# Patient Record
Sex: FEMALE | Race: WHITE | NOT HISPANIC OR LATINO | Employment: FULL TIME | URBAN - METROPOLITAN AREA
[De-identification: names, ages, dates, MRNs, and addresses within clinical notes are randomized per-mention and may not be internally consistent; named-entity substitution may affect disease eponyms.]

---

## 2023-08-28 PROBLEM — N95.0 POSTMENOPAUSAL BLEEDING: Status: ACTIVE | Noted: 2023-08-28

## 2023-08-28 PROBLEM — M79.641 PAIN IN BOTH HANDS: Status: ACTIVE | Noted: 2023-08-28

## 2023-08-28 PROBLEM — H01.006 BLEPHARITIS OF BOTH EYES: Status: ACTIVE | Noted: 2023-08-28

## 2023-08-28 PROBLEM — H52.13 MYOPIA OF BOTH EYES: Status: ACTIVE | Noted: 2023-08-28

## 2023-08-28 PROBLEM — R92.8 ABNORMAL MAMMOGRAM: Status: ACTIVE | Noted: 2023-08-28

## 2023-08-28 PROBLEM — M06.9 RHEUMATOID ARTHRITIS (MULTI): Status: ACTIVE | Noted: 2023-08-28

## 2023-08-28 PROBLEM — B37.9 YEAST INFECTION: Status: ACTIVE | Noted: 2023-08-28

## 2023-08-28 PROBLEM — M79.642 PAIN IN BOTH HANDS: Status: ACTIVE | Noted: 2023-08-28

## 2023-08-28 PROBLEM — R55 SYNCOPE: Status: ACTIVE | Noted: 2023-08-28

## 2023-08-28 PROBLEM — R07.9 CHEST PAIN: Status: ACTIVE | Noted: 2023-08-28

## 2023-08-28 PROBLEM — H01.003 BLEPHARITIS OF BOTH EYES: Status: ACTIVE | Noted: 2023-08-28

## 2023-08-28 PROBLEM — E78.5 DYSLIPIDEMIA (HIGH LDL; LOW HDL): Status: ACTIVE | Noted: 2023-08-28

## 2023-08-28 PROBLEM — K52.9 INFLAMMATORY BOWEL DISEASE: Status: ACTIVE | Noted: 2023-08-28

## 2023-08-28 PROBLEM — R00.2 PALPITATIONS: Status: ACTIVE | Noted: 2023-08-28

## 2023-08-28 PROBLEM — K90.0 CELIAC DISEASE (HHS-HCC): Status: ACTIVE | Noted: 2023-08-28

## 2023-08-28 RX ORDER — TOFACITINIB 11 MG/1
11 TABLET, FILM COATED, EXTENDED RELEASE ORAL DAILY
COMMUNITY
Start: 2019-10-04 | End: 2023-10-05 | Stop reason: ALTCHOICE

## 2023-08-28 RX ORDER — CELECOXIB 200 MG/1
200 CAPSULE ORAL 2 TIMES DAILY
COMMUNITY
Start: 2023-08-03 | End: 2023-11-01 | Stop reason: ALTCHOICE

## 2023-08-28 RX ORDER — TOCILIZUMAB 180 MG/ML
0.9 INJECTION, SOLUTION SUBCUTANEOUS
COMMUNITY
Start: 2023-08-03 | End: 2023-10-05 | Stop reason: SDUPTHER

## 2023-10-05 ENCOUNTER — LAB (OUTPATIENT)
Dept: LAB | Facility: LAB | Age: 53
End: 2023-10-05
Payer: COMMERCIAL

## 2023-10-05 ENCOUNTER — OFFICE VISIT (OUTPATIENT)
Dept: RHEUMATOLOGY | Facility: CLINIC | Age: 53
End: 2023-10-05
Payer: COMMERCIAL

## 2023-10-05 VITALS
BODY MASS INDEX: 22.18 KG/M2 | WEIGHT: 138 LBS | SYSTOLIC BLOOD PRESSURE: 106 MMHG | DIASTOLIC BLOOD PRESSURE: 66 MMHG | HEIGHT: 66 IN

## 2023-10-05 DIAGNOSIS — M06.9 RHEUMATOID ARTHRITIS, INVOLVING UNSPECIFIED SITE, UNSPECIFIED WHETHER RHEUMATOID FACTOR PRESENT (MULTI): Primary | ICD-10-CM

## 2023-10-05 DIAGNOSIS — M06.9 RHEUMATOID ARTHRITIS, INVOLVING UNSPECIFIED SITE, UNSPECIFIED WHETHER RHEUMATOID FACTOR PRESENT (MULTI): ICD-10-CM

## 2023-10-05 DIAGNOSIS — Z79.899 ENCOUNTER FOR LONG-TERM (CURRENT) USE OF MEDICATIONS: ICD-10-CM

## 2023-10-05 LAB
ALBUMIN SERPL BCP-MCNC: 4.6 G/DL (ref 3.4–5)
ALP SERPL-CCNC: 55 U/L (ref 33–110)
ALT SERPL W P-5'-P-CCNC: 22 U/L (ref 7–45)
ANION GAP SERPL CALC-SCNC: 16 MMOL/L (ref 10–20)
AST SERPL W P-5'-P-CCNC: 27 U/L (ref 9–39)
BILIRUB SERPL-MCNC: 0.7 MG/DL (ref 0–1.2)
BUN SERPL-MCNC: 15 MG/DL (ref 6–23)
CALCIUM SERPL-MCNC: 9 MG/DL (ref 8.6–10.3)
CHLORIDE SERPL-SCNC: 101 MMOL/L (ref 98–107)
CO2 SERPL-SCNC: 29 MMOL/L (ref 21–32)
CREAT SERPL-MCNC: 0.68 MG/DL (ref 0.5–1.05)
CRP SERPL-MCNC: 0.13 MG/DL
ERYTHROCYTE [DISTWIDTH] IN BLOOD BY AUTOMATED COUNT: 12.4 % (ref 11.5–14.5)
ERYTHROCYTE [SEDIMENTATION RATE] IN BLOOD BY WESTERGREN METHOD: <1 MM/H (ref 0–30)
GFR SERPL CREATININE-BSD FRML MDRD: >90 ML/MIN/1.73M*2
GLUCOSE SERPL-MCNC: 69 MG/DL (ref 74–99)
HCT VFR BLD AUTO: 42.8 % (ref 36–46)
HGB BLD-MCNC: 14.2 G/DL (ref 12–16)
MCH RBC QN AUTO: 32 PG (ref 26–34)
MCHC RBC AUTO-ENTMCNC: 33.2 G/DL (ref 32–36)
MCV RBC AUTO: 96 FL (ref 80–100)
NRBC BLD-RTO: 0 /100 WBCS (ref 0–0)
PLATELET # BLD AUTO: 258 X10*3/UL (ref 150–450)
PMV BLD AUTO: 9.1 FL (ref 7.5–11.5)
POTASSIUM SERPL-SCNC: 4.6 MMOL/L (ref 3.5–5.3)
PROT SERPL-MCNC: 6.8 G/DL (ref 6.4–8.2)
RBC # BLD AUTO: 4.44 X10*6/UL (ref 4–5.2)
SODIUM SERPL-SCNC: 141 MMOL/L (ref 136–145)
WBC # BLD AUTO: 5.1 X10*3/UL (ref 4.4–11.3)

## 2023-10-05 PROCEDURE — 85027 COMPLETE CBC AUTOMATED: CPT

## 2023-10-05 PROCEDURE — 86140 C-REACTIVE PROTEIN: CPT

## 2023-10-05 PROCEDURE — 99213 OFFICE O/P EST LOW 20 MIN: CPT | Performed by: INTERNAL MEDICINE

## 2023-10-05 PROCEDURE — 1036F TOBACCO NON-USER: CPT | Performed by: INTERNAL MEDICINE

## 2023-10-05 PROCEDURE — 85652 RBC SED RATE AUTOMATED: CPT

## 2023-10-05 PROCEDURE — 36415 COLL VENOUS BLD VENIPUNCTURE: CPT

## 2023-10-05 PROCEDURE — 80053 COMPREHEN METABOLIC PANEL: CPT

## 2023-10-05 ASSESSMENT — ENCOUNTER SYMPTOMS
DIZZINESS: 0
DYSURIA: 0
HEADACHES: 0
ABDOMINAL PAIN: 0
SHORTNESS OF BREATH: 0
FATIGUE: 0
ADENOPATHY: 0
SLEEP DISTURBANCE: 0

## 2023-10-05 NOTE — PROGRESS NOTES
Subjective   Patient ID: Sarika Figueroa is a 53 y.o. female who presents for No chief complaint on file..  HPI  Patient with rheumatoid arthritis and rheumatoid nodules specifically in her hands.  Previous medications have included methotrexate, leflunomide, Humira, Enbrel, Xeljanz, Orencia.    At her last visit we started her on Actemra.  She has been doing it every other week.  She does feel that the size of her nodules have decreased though there is 1 slightly enlarged in the index finger of her right hand and her pain has significantly improved.    Denies any side effects to the medication.    Has not done her blood work.    Denies any thrush or yeast infections.  No stomach issues.  No rashes.  Review of Systems   Constitutional:  Negative for fatigue.   HENT:  Negative for mouth sores.    Eyes:  Negative for visual disturbance.   Respiratory:  Negative for shortness of breath.    Cardiovascular:  Negative for chest pain.   Gastrointestinal:  Negative for abdominal pain.   Genitourinary:  Negative for dysuria.   Musculoskeletal:         Per HPI   Skin:  Negative for rash.   Neurological:  Negative for dizziness and headaches.   Hematological:  Negative for adenopathy.   Psychiatric/Behavioral:  Negative for sleep disturbance.        Objective   Physical Exam  GEN: NAD A&O x3 appropriate affect  SKIN: no rashes seen on exposed skin  MSK:  Rheumatoid nodule on medial aspect of index finger and also on left hand and third lateral DIP.  No swelling in the MCPs or PIPs no tenderness no swelling in the wrists elbows shoulders knees ankles  Assessment/Plan        Rheumatoid arthritis -previously has been on methotrexate, Humira, Enbrel, Xeljanz, Orencia.  Currently on Actemra and she does have improvement in her pain symptoms.  Does not have as many swollen tender joints as she did previously and also has not had any new rheumatoid nodules appear.  She still has not done blood work, we will put in for standing  order into the system.    We can see her again in 4 to 6 months or as needed.

## 2023-10-26 ENCOUNTER — PHARMACY VISIT (OUTPATIENT)
Dept: PHARMACY | Facility: CLINIC | Age: 53
End: 2023-10-26
Payer: COMMERCIAL

## 2023-10-26 ENCOUNTER — SPECIALTY PHARMACY (OUTPATIENT)
Dept: PHARMACY | Facility: CLINIC | Age: 53
End: 2023-10-26

## 2023-10-26 PROCEDURE — RXMED WILLOW AMBULATORY MEDICATION CHARGE

## 2023-10-27 ENCOUNTER — APPOINTMENT (OUTPATIENT)
Dept: RHEUMATOLOGY | Facility: CLINIC | Age: 53
End: 2023-10-27
Payer: COMMERCIAL

## 2023-10-30 ENCOUNTER — OFFICE VISIT (OUTPATIENT)
Dept: PRIMARY CARE | Facility: CLINIC | Age: 53
End: 2023-10-30
Payer: COMMERCIAL

## 2023-10-30 DIAGNOSIS — R10.13 EPIGASTRIC PAIN: Primary | ICD-10-CM

## 2023-10-30 PROCEDURE — 99213 OFFICE O/P EST LOW 20 MIN: CPT | Performed by: INTERNAL MEDICINE

## 2023-10-30 PROCEDURE — 1036F TOBACCO NON-USER: CPT | Performed by: INTERNAL MEDICINE

## 2023-10-30 NOTE — PROGRESS NOTES
Subjective   Patient ID: Sarika Figueroa is a 53 y.o. female who presents for No chief complaint on file..    HPI  Please see other encounter , she had been marked as no show but she was actually here waiting .       She is actually here and there was a glitch with signing her in   Abdominal pain on October 15  upper stomach cramping not nauseous dull 6/10 get s better at the end of the day tried to take out caffeine   Does n ot take vitamins , was waking up with with it   No difference with other foods  no apple cider , during the event had probiotic bars and the powder vegan gluten free but she stopped it   Some burping not different , no constipation not looser   She started actemra, missed her last dose will administer it this Thursday      Review of Systems  General: Denies fever, chills, night sweats,  Eyes: Negative for recent visual changes  Ears, Nose, Throat :  Negative for hearing changes, sinus discomfort  Dermatologic: Negative for new skin conditions, rash  Respiratory: Negative for wheezing, shortness of breath, cough  Cardiovascular: Negative for chest pain, palpitations, or leg swelling  Gastrointestinal: Negative for nausea/vomiting,  changes in bowel habits  Genitourinary NEGATIVE FOR URINARY INCONTINENCE urgency , frequency, discomfort   Musculoskeletal: see hpi  Neurological: Negative for headaches, dizziness    Previous history  Past Medical History:   Diagnosis Date    Chest pain, unspecified 03/21/2018    Chest pain    Encounter for other screening for malignant neoplasm of breast 10/23/2020    Breast screening    Encounter for screening for malignant neoplasm of colon 01/12/2021    Colon cancer screening    Excessive and frequent menstruation with regular cycle     Menorrhagia with regular cycle    Hyperlipidemia, unspecified 10/28/2021    Dyslipidemia (high LDL; low HDL)    Palpitations 03/21/2018    Palpitations    Personal history of other infectious and parasitic diseases 06/14/2016     History of Helicobacter infection     Past Surgical History:   Procedure Laterality Date    BACK SURGERY  10/14/2016    Back Surgery    COLONOSCOPY  06/14/2016    Complete Colonoscopy    HYSTEROSCOPY  02/15/2018    Hysteroscopy With Endometrial Ablation    OTHER SURGICAL HISTORY  03/02/2022    Dilation and curettage    SHOULDER SURGERY  10/14/2016    Shoulder Surgery     Social History     Tobacco Use    Smoking status: Never     Passive exposure: Never    Smokeless tobacco: Never   Substance Use Topics    Alcohol use: Yes     Alcohol/week: 2.0 standard drinks of alcohol     Types: 2 Glasses of wine per week    Drug use: Never     Family History   Problem Relation Name Age of Onset    Migraines Mother      Colonic polyp Father      Other (abdominal aneurysm) Brother      Ulcerative colitis Paternal Grandfather      Colonic polyp Paternal Grandfather      Ulcerative colitis Other sibling      Allergies   Allergen Reactions    Humira [Adalimumab] Rash     Current Outpatient Medications   Medication Instructions    celecoxib (CELEBREX) 200 mg, oral, 2 times daily, Take with food     omeprazole (PRILOSEC) 40 mg, oral, Daily before breakfast    tocilizumab (Actemra ACTPen) 162 mg/0.9 mL INJECT 1 PEN (162MG) UNDER THE SKIN EVERY OTHER WEEK.       Objective       Physical Exam  Vital Signs: as recorded above  General: Well groomed, well nourished   Orientation:  Alert , oriented to time, place , and person   Mood and Affect:  Cooperative , no apparent distress normal affect  Skin: Good color, good turgor  Eyes: Extra ocular muscle movements intact, anicteric sclerae  Neck: Supple, full range of movement  Chest: Normal breath sounds, normal chest wall exam, symmetric, good air entry, clear to auscultation  Heart: Regular rate and rhythm, without murmur, gallop, or rubs  Abdomen soft mild-mod epigastric tenderness  no masses felt no hepatosplenomegaly, no rebound or guarding  BACK:  no CTLS spine tenderness, no flank  tenderness  Extremities: full range of movement  bilateral UE and bilateral LE,  no lower extremity edema  Neurological: Alert, oriented, cranial nerves II-XII intact except for visual acuity  Sensation:  Intact   Gait: normal steady      Assessment/Plan   Sarika Figueroa is a 53 y.o. female who presents for the concerns below:    Problem List Items Addressed This Visit    None  ABDOMINAL PAIN/REFLUX PLAN:  bland diet no spicy, oily, dairy, acidic i.e. orange juice, coffee, until pain is better  hold off on use of nsaids  if pain is worse will consider imaging studies  start with PPI  hold off on certain medications i.e. supplements  if not better will consult Gastroenterology if severe or associated with fever, vomiting, bloody stool proceed to the emergency dept    Mammogram order in and gyne referral        Discussed with:   Return in : cpe in early December     Portions of this note were generated using digital voice recognition software, and may contain grammatical errors       Ami Ramirez MD  10/30/23  10:11 AM

## 2023-11-01 ENCOUNTER — CONSULT (OUTPATIENT)
Dept: ALLERGY | Facility: CLINIC | Age: 53
End: 2023-11-01
Payer: COMMERCIAL

## 2023-11-01 VITALS — BODY MASS INDEX: 21.95 KG/M2 | OXYGEN SATURATION: 99 % | HEART RATE: 70 BPM | WEIGHT: 136 LBS

## 2023-11-01 DIAGNOSIS — J30.81 ALLERGIC RHINITIS DUE TO ANIMAL HAIR AND DANDER: Primary | ICD-10-CM

## 2023-11-01 PROBLEM — R07.89 CHEST TIGHTNESS: Status: ACTIVE | Noted: 2023-11-01

## 2023-11-01 PROCEDURE — 99204 OFFICE O/P NEW MOD 45 MIN: CPT | Performed by: ALLERGY & IMMUNOLOGY

## 2023-11-01 PROCEDURE — 95004 PERQ TESTS W/ALRGNC XTRCS: CPT | Performed by: ALLERGY & IMMUNOLOGY

## 2023-11-01 PROCEDURE — 95018 ALL TSTG PERQ&IQ DRUGS/BIOL: CPT | Performed by: ALLERGY & IMMUNOLOGY

## 2023-11-01 RX ORDER — TRIAMCINOLONE ACETONIDE 55 UG/1
2 SPRAY, METERED NASAL DAILY
Qty: 16.9 ML | Refills: 0
Start: 2023-11-01 | End: 2024-01-30

## 2023-11-01 RX ORDER — MONTELUKAST SODIUM 10 MG/1
10 TABLET ORAL DAILY PRN
Qty: 30 TABLET | Refills: 5 | Status: SHIPPED | OUTPATIENT
Start: 2023-11-01 | End: 2024-10-31

## 2023-11-01 RX ORDER — ALBUTEROL SULFATE 90 UG/1
1-2 AEROSOL, METERED RESPIRATORY (INHALATION) EVERY 4 HOURS PRN
Qty: 8.5 G | Refills: 11 | Status: SHIPPED | OUTPATIENT
Start: 2023-11-01 | End: 2024-10-31

## 2023-11-01 ASSESSMENT — ENCOUNTER SYMPTOMS
SHORTNESS OF BREATH: 0
COUGH: 0
RHINORRHEA: 1
PALPITATIONS: 0
SORE THROAT: 0
CONSTITUTIONAL NEGATIVE: 1
CONFUSION: 0
CHEST TIGHTNESS: 1
SINUS PRESSURE: 1
STRIDOR: 0
SINUS PAIN: 0
SLEEP DISTURBANCE: 0
AGITATION: 0
DYSPHORIC MOOD: 0
WHEEZING: 0

## 2023-11-01 NOTE — PATIENT INSTRUCTIONS
Skin testing is positive to dog, dust mite, cat, Alternaria mold, grass, tree and weeds.    Stop Benadryl.    Take Zyrtec and Singulair (montelukast) prior to exposures to help with sinus and lung inflammation.  Side effects reported include vivid dreams, mood changes as well as ear popping.  If you experience ear popping, this indicates the medication is working so please continue to take it.    Start allergy immunotherapy.  We will prepare vial mix and call you to schedule your first shot.  (CPT codes for mix vials 00909.  Allergy injections  07843 or 31568).       Start over the counter Nasacort AQ 2 sprays each side one time a day.  To increase the efficacy of your nasal spray, be sure to look down while using it.?  Spray slightly away from the direction of your nasal septum (the bone in the middle of your nose) and only sniff after you have sprayed - avoid spraying and sniffing at the same time, or else a lot of spray will go down your throat.    Be sure to wash your bedding, including your sheets, weekly in hot water, in order to reduce dust mites.     Encase your pillow and mattress in a hypoallergenic or anti-dust mite case.    Invest in a HEPA air filter if you do not have one, and if possible, put it in the patient's bedroom.    If you have pets, avoid having them in the bedroom.      Albuterol as needed with wheezing.

## 2023-11-01 NOTE — ASSESSMENT & PLAN NOTE
Increased symptoms with dog exposure. Her daughter will be having a baby soon and she is unable to tolerate the dog at her daughter's home. She will start immunotherapy and take zyrtec and montelukast prior to exposure.

## 2023-11-01 NOTE — PROGRESS NOTES
"    Subjective   Patient ID:   [id number]   Sarika Figueroa is a 53 y.o. female who presents for Allergy Testing (NPV, congestion, sneezing and coughing ).    Chief Complaint   Patient presents with    Allergy Testing     NPV, congestion, sneezing and coughing      Patient reports a H/O dog and cat allergy.  Her daughter, who is pregnant, has a [place] she reacts to.  She has to take Benadryl prior to going there and will still experience chest tightness.  Her other daughter has a cat she keeps in her room and she would like to be able to have contact with it.      Patient has a H/O seasonal allergy symptoms that always start around 08/15/2023 as well as spring.  She will have swelling in the \"whites\" of her eyes and will use Opcon A eyedrops and Claritin or Zyrtec,with minimal relief.  She has used Flonase intermittently.  Patient notes she is always congested, but the chest tightness occurs only when she is around pet dander.  She has never had formal allergy testing.    Patient requests allergy testing to determine allergen status.        Review of Systems   Constitutional: Negative.    HENT:  Positive for rhinorrhea, sinus pressure and sneezing. Negative for ear discharge, ear pain, sinus pain and sore throat.    Respiratory:  Positive for chest tightness. Negative for cough, shortness of breath, wheezing and stridor.    Cardiovascular:  Negative for chest pain, palpitations and leg swelling.   Skin:  Negative for rash.   Allergic/Immunologic: Positive for environmental allergies. Negative for food allergies and immunocompromised state.   Psychiatric/Behavioral:  Negative for agitation, confusion, dysphoric mood and sleep disturbance.        Objective     Pulse 70   Wt 61.7 kg (136 lb)   SpO2 99%   BMI 21.95 kg/m²      Physical Exam  Constitutional:       Appearance: Normal appearance.   HENT:      Head: Normocephalic and atraumatic.      Right Ear: External ear normal. There is no impacted cerumen.    "   Left Ear: External ear normal. There is no impacted cerumen.      Nose: Congestion present. No rhinorrhea.      Comments: bilateral nasal turbinate edema   Eyes:      Extraocular Movements: Extraocular movements intact.      Conjunctiva/sclera: Conjunctivae normal.      Pupils: Pupils are equal, round, and reactive to light.   Cardiovascular:      Rate and Rhythm: Normal rate and regular rhythm.      Heart sounds: No murmur heard.     No friction rub. No gallop.   Pulmonary:      Effort: No respiratory distress.      Breath sounds: No wheezing, rhonchi or rales.   Skin:     General: Skin is warm and dry.   Neurological:      Mental Status: She is alert.   Psychiatric:         Mood and Affect: Mood normal.         Behavior: Behavior normal.            Current Outpatient Medications   Medication Sig Dispense Refill    omeprazole (PriLOSEC) 40 mg DR capsule Take 1 capsule (40 mg) by mouth once daily in the morning. Take before meals. 30 capsule 2    tocilizumab (Actemra ACTPen) 162 mg/0.9 mL INJECT 1 PEN (162MG) UNDER THE SKIN EVERY OTHER WEEK. 1.8 mL 4    albuterol (ProAir HFA) 90 mcg/actuation inhaler Inhale 1-2 puffs every 4 hours if needed for wheezing. 8.5 g 11    montelukast (Singulair) 10 mg tablet Take 1 tablet (10 mg) by mouth once daily as needed (prior to exposure). 30 tablet 5    triamcinolone (Nasacort) 55 mcg nasal inhaler Administer 2 sprays into each nostril once daily. 16.9 mL 0     No current facility-administered medications for this visit.         Orders Placed This Encounter   Procedures    Allergy skin tests allergens, each          Assessment/Plan         Allergic rhinitis due to animal hair and dander  Increased symptoms with dog exposure. Her daughter will be having a baby soon and she is unable to tolerate the dog at her daughter's home. She will start immunotherapy and take zyrtec and montelukast prior to exposure.     Chest tightness  Albuterol as needed. Singulair prior to exposure to  dog      By signing my name below, I, Darcy Mckeon, attest that this documentation has been prepared under the direction and in the presence of Elyse Singer MD.   All medical record entries made by the [facility] were at my direction and personally dictated by me. I have reviewed the chart and agree that the record accurately reflects my personal performance of the history, physical exam, discussion and plan.

## 2023-11-14 DIAGNOSIS — J30.9 ALLERGIC RHINITIS, UNSPECIFIED SEASONALITY, UNSPECIFIED TRIGGER: Primary | ICD-10-CM

## 2023-11-14 PROCEDURE — 95165 ANTIGEN THERAPY SERVICES: CPT | Performed by: ALLERGY & IMMUNOLOGY

## 2023-11-24 ENCOUNTER — PHARMACY VISIT (OUTPATIENT)
Dept: PHARMACY | Facility: CLINIC | Age: 53
End: 2023-11-24
Payer: COMMERCIAL

## 2023-11-24 ENCOUNTER — SPECIALTY PHARMACY (OUTPATIENT)
Dept: PHARMACY | Facility: CLINIC | Age: 53
End: 2023-11-24

## 2023-11-24 PROCEDURE — RXMED WILLOW AMBULATORY MEDICATION CHARGE

## 2023-11-28 ENCOUNTER — CLINICAL SUPPORT (OUTPATIENT)
Dept: ALLERGY | Facility: CLINIC | Age: 53
End: 2023-11-28
Payer: COMMERCIAL

## 2023-11-28 DIAGNOSIS — J30.9 ALLERGIC RHINITIS, UNSPECIFIED SEASONALITY, UNSPECIFIED TRIGGER: ICD-10-CM

## 2023-11-28 PROCEDURE — 95117 IMMUNOTHERAPY INJECTIONS: CPT | Performed by: ALLERGY & IMMUNOLOGY

## 2023-11-30 ENCOUNTER — SPECIALTY PHARMACY (OUTPATIENT)
Dept: PHARMACY | Facility: CLINIC | Age: 53
End: 2023-11-30

## 2023-12-05 ENCOUNTER — CLINICAL SUPPORT (OUTPATIENT)
Dept: ALLERGY | Facility: CLINIC | Age: 53
End: 2023-12-05
Payer: COMMERCIAL

## 2023-12-05 DIAGNOSIS — J30.9 ALLERGIC RHINITIS, UNSPECIFIED SEASONALITY, UNSPECIFIED TRIGGER: ICD-10-CM

## 2023-12-05 PROCEDURE — 95117 IMMUNOTHERAPY INJECTIONS: CPT | Performed by: ALLERGY & IMMUNOLOGY

## 2023-12-07 ENCOUNTER — ANCILLARY PROCEDURE (OUTPATIENT)
Dept: RADIOLOGY | Facility: CLINIC | Age: 53
End: 2023-12-07
Payer: COMMERCIAL

## 2023-12-07 DIAGNOSIS — R10.13 EPIGASTRIC PAIN: ICD-10-CM

## 2023-12-07 PROCEDURE — 77063 BREAST TOMOSYNTHESIS BI: CPT

## 2023-12-07 PROCEDURE — 77067 SCR MAMMO BI INCL CAD: CPT | Performed by: RADIOLOGY

## 2023-12-07 PROCEDURE — 77063 BREAST TOMOSYNTHESIS BI: CPT | Performed by: RADIOLOGY

## 2023-12-07 PROCEDURE — 77067 SCR MAMMO BI INCL CAD: CPT

## 2023-12-15 ENCOUNTER — CLINICAL SUPPORT (OUTPATIENT)
Dept: ALLERGY | Facility: CLINIC | Age: 53
End: 2023-12-15
Payer: COMMERCIAL

## 2023-12-15 DIAGNOSIS — J30.9 ALLERGIC RHINITIS, UNSPECIFIED SEASONALITY, UNSPECIFIED TRIGGER: ICD-10-CM

## 2023-12-15 PROCEDURE — 95117 IMMUNOTHERAPY INJECTIONS: CPT | Performed by: ALLERGY & IMMUNOLOGY

## 2023-12-18 ENCOUNTER — CLINICAL SUPPORT (OUTPATIENT)
Dept: ALLERGY | Facility: CLINIC | Age: 53
End: 2023-12-18
Payer: COMMERCIAL

## 2023-12-18 DIAGNOSIS — J30.9 ALLERGIC RHINITIS, UNSPECIFIED SEASONALITY, UNSPECIFIED TRIGGER: ICD-10-CM

## 2023-12-18 PROCEDURE — 95117 IMMUNOTHERAPY INJECTIONS: CPT | Performed by: ALLERGY & IMMUNOLOGY

## 2023-12-19 NOTE — PROGRESS NOTES
Approximately 30 minutes injection patient complained of the sensation of throat tightness.  She denies any problems breathing, itching, congestion, or any other symptoms.  She does not have a history of asthma but has had a history of chest tightness when exposed to dogs.  She received Vial 4 0.3    She was given Zyrtec and her symptoms resolved within 5 minutes I believe this is most likely anxiety versus a shot reaction.  Her symptoms resolved prior to the medication having any chance to work.    I have asked her to go ahead and take Zyrtec prior to dosing and we will repeat her last dose just to be cautious.    Her vitals and physical exam were completely normal

## 2023-12-22 DIAGNOSIS — M06.9 RHEUMATOID ARTHRITIS, INVOLVING UNSPECIFIED SITE, UNSPECIFIED WHETHER RHEUMATOID FACTOR PRESENT (MULTI): Primary | ICD-10-CM

## 2023-12-23 RX ORDER — TOCILIZUMAB 180 MG/ML
162 INJECTION, SOLUTION SUBCUTANEOUS
Qty: 6 EACH | Refills: 1 | Status: SHIPPED | OUTPATIENT
Start: 2023-12-23 | End: 2024-05-21 | Stop reason: SDUPTHER

## 2023-12-26 ENCOUNTER — PHARMACY VISIT (OUTPATIENT)
Dept: PHARMACY | Facility: CLINIC | Age: 53
End: 2023-12-26
Payer: COMMERCIAL

## 2023-12-26 ENCOUNTER — SPECIALTY PHARMACY (OUTPATIENT)
Dept: PHARMACY | Facility: CLINIC | Age: 53
End: 2023-12-26

## 2023-12-26 PROCEDURE — RXMED WILLOW AMBULATORY MEDICATION CHARGE

## 2023-12-27 ENCOUNTER — APPOINTMENT (OUTPATIENT)
Dept: PRIMARY CARE | Facility: CLINIC | Age: 53
End: 2023-12-27
Payer: COMMERCIAL

## 2024-01-05 ENCOUNTER — CLINICAL SUPPORT (OUTPATIENT)
Dept: ALLERGY | Facility: CLINIC | Age: 54
End: 2024-01-05
Payer: COMMERCIAL

## 2024-01-05 DIAGNOSIS — J30.9 ALLERGIC RHINITIS, UNSPECIFIED SEASONALITY, UNSPECIFIED TRIGGER: ICD-10-CM

## 2024-01-05 PROCEDURE — 95117 IMMUNOTHERAPY INJECTIONS: CPT | Performed by: ALLERGY & IMMUNOLOGY

## 2024-01-11 ENCOUNTER — OFFICE VISIT (OUTPATIENT)
Dept: PRIMARY CARE | Facility: CLINIC | Age: 54
End: 2024-01-11
Payer: COMMERCIAL

## 2024-01-11 ENCOUNTER — LAB (OUTPATIENT)
Dept: LAB | Facility: LAB | Age: 54
End: 2024-01-11
Payer: COMMERCIAL

## 2024-01-11 VITALS — BODY MASS INDEX: 21.63 KG/M2 | WEIGHT: 134 LBS | DIASTOLIC BLOOD PRESSURE: 73 MMHG | SYSTOLIC BLOOD PRESSURE: 100 MMHG

## 2024-01-11 DIAGNOSIS — R10.9 ABDOMINAL PAIN, UNSPECIFIED ABDOMINAL LOCATION: Primary | ICD-10-CM

## 2024-01-11 DIAGNOSIS — R10.9 ABDOMINAL PAIN, UNSPECIFIED ABDOMINAL LOCATION: ICD-10-CM

## 2024-01-11 DIAGNOSIS — M06.9 RHEUMATOID ARTHRITIS, INVOLVING UNSPECIFIED SITE, UNSPECIFIED WHETHER RHEUMATOID FACTOR PRESENT (MULTI): ICD-10-CM

## 2024-01-11 LAB
ALBUMIN SERPL BCP-MCNC: 4.6 G/DL (ref 3.4–5)
ALP SERPL-CCNC: 48 U/L (ref 33–110)
ALT SERPL W P-5'-P-CCNC: 17 U/L (ref 7–45)
ANION GAP SERPL CALC-SCNC: 13 MMOL/L (ref 10–20)
APPEARANCE UR: CLEAR
AST SERPL W P-5'-P-CCNC: 20 U/L (ref 9–39)
BACTERIA #/AREA URNS AUTO: ABNORMAL /HPF
BILIRUB SERPL-MCNC: 0.8 MG/DL (ref 0–1.2)
BILIRUB UR STRIP.AUTO-MCNC: NEGATIVE MG/DL
BUN SERPL-MCNC: 15 MG/DL (ref 6–23)
CALCIUM SERPL-MCNC: 9.5 MG/DL (ref 8.6–10.6)
CHLORIDE SERPL-SCNC: 102 MMOL/L (ref 98–107)
CO2 SERPL-SCNC: 28 MMOL/L (ref 21–32)
COLOR UR: ABNORMAL
CREAT SERPL-MCNC: 0.68 MG/DL (ref 0.5–1.05)
CRP SERPL-MCNC: <0.1 MG/DL
EGFRCR SERPLBLD CKD-EPI 2021: >90 ML/MIN/1.73M*2
ERYTHROCYTE [DISTWIDTH] IN BLOOD BY AUTOMATED COUNT: 11.7 % (ref 11.5–14.5)
ERYTHROCYTE [SEDIMENTATION RATE] IN BLOOD BY WESTERGREN METHOD: <1 MM/H (ref 0–30)
GLUCOSE SERPL-MCNC: 89 MG/DL (ref 74–99)
GLUCOSE UR STRIP.AUTO-MCNC: NEGATIVE MG/DL
HCT VFR BLD AUTO: 40 % (ref 36–46)
HGB BLD-MCNC: 13.6 G/DL (ref 12–16)
KETONES UR STRIP.AUTO-MCNC: NEGATIVE MG/DL
LEUKOCYTE ESTERASE UR QL STRIP.AUTO: NEGATIVE
MCH RBC QN AUTO: 32.7 PG (ref 26–34)
MCHC RBC AUTO-ENTMCNC: 34 G/DL (ref 32–36)
MCV RBC AUTO: 96 FL (ref 80–100)
NITRITE UR QL STRIP.AUTO: NEGATIVE
NRBC BLD-RTO: 0 /100 WBCS (ref 0–0)
PH UR STRIP.AUTO: 6 [PH]
PLATELET # BLD AUTO: 244 X10*3/UL (ref 150–450)
POTASSIUM SERPL-SCNC: 3.8 MMOL/L (ref 3.5–5.3)
PROT SERPL-MCNC: 6.6 G/DL (ref 6.4–8.2)
PROT UR STRIP.AUTO-MCNC: NEGATIVE MG/DL
RBC # BLD AUTO: 4.16 X10*6/UL (ref 4–5.2)
RBC # UR STRIP.AUTO: ABNORMAL /UL
RBC #/AREA URNS AUTO: ABNORMAL /HPF
SODIUM SERPL-SCNC: 139 MMOL/L (ref 136–145)
SP GR UR STRIP.AUTO: 1
UROBILINOGEN UR STRIP.AUTO-MCNC: <2 MG/DL
WBC # BLD AUTO: 4.5 X10*3/UL (ref 4.4–11.3)
WBC #/AREA URNS AUTO: ABNORMAL /HPF

## 2024-01-11 PROCEDURE — 85652 RBC SED RATE AUTOMATED: CPT

## 2024-01-11 PROCEDURE — 1036F TOBACCO NON-USER: CPT | Performed by: INTERNAL MEDICINE

## 2024-01-11 PROCEDURE — 36415 COLL VENOUS BLD VENIPUNCTURE: CPT

## 2024-01-11 PROCEDURE — 85027 COMPLETE CBC AUTOMATED: CPT

## 2024-01-11 PROCEDURE — 86140 C-REACTIVE PROTEIN: CPT

## 2024-01-11 PROCEDURE — 81001 URINALYSIS AUTO W/SCOPE: CPT

## 2024-01-11 PROCEDURE — 80053 COMPREHEN METABOLIC PANEL: CPT

## 2024-01-11 PROCEDURE — 99214 OFFICE O/P EST MOD 30 MIN: CPT | Performed by: INTERNAL MEDICINE

## 2024-01-11 RX ORDER — ONDANSETRON 4 MG/1
4 TABLET, ORALLY DISINTEGRATING ORAL EVERY 8 HOURS PRN
Qty: 20 TABLET | Refills: 0 | Status: SHIPPED | OUTPATIENT
Start: 2024-01-11 | End: 2024-01-18

## 2024-01-11 ASSESSMENT — PATIENT HEALTH QUESTIONNAIRE - PHQ9
SUM OF ALL RESPONSES TO PHQ9 QUESTIONS 1 AND 2: 0
2. FEELING DOWN, DEPRESSED OR HOPELESS: NOT AT ALL
1. LITTLE INTEREST OR PLEASURE IN DOING THINGS: NOT AT ALL

## 2024-01-11 ASSESSMENT — COLUMBIA-SUICIDE SEVERITY RATING SCALE - C-SSRS
6. HAVE YOU EVER DONE ANYTHING, STARTED TO DO ANYTHING, OR PREPARED TO DO ANYTHING TO END YOUR LIFE?: NO
1. IN THE PAST MONTH, HAVE YOU WISHED YOU WERE DEAD OR WISHED YOU COULD GO TO SLEEP AND NOT WAKE UP?: NO
2. HAVE YOU ACTUALLY HAD ANY THOUGHTS OF KILLING YOURSELF?: NO

## 2024-01-11 ASSESSMENT — ENCOUNTER SYMPTOMS
OCCASIONAL FEELINGS OF UNSTEADINESS: 0
DEPRESSION: 0
LOSS OF SENSATION IN FEET: 0

## 2024-01-11 NOTE — PROGRESS NOTES
Subjective   Patient ID: Sarika Figueroa is a 53 y.o. female who presents for EPV and Abdominal Pain (Two flare ups: New years john. And 1/8/24.).    HPI  Patient in for a visit  MOUNIKA diarrhea vomiting could not keep water down ,may have been a celiac flare up two days ago same symptoms again soft stool gasa vomiting could not keep any liquid down , pain in the lower abdomen severe malgorzata over the whole day could not sleep , too her dtr's zofran and was okay pain 2/10   Next day Wed drank some tea the pain started again, lasted the whole day stopped in the afternoon did not eat anything only pedialyte  No travel no ill contacts , the only NYE morning episode the night before 8 pm moose dia Bal and Rudy .  Only other thing was kimchi   Pressure lower abdomen as well but no urinary problems    Review of Systems  General: Denies fever, night sweats,  Eyes: Negative for recent visual changes  Ears, Nose, Throat :  Negative for hearing changes, sinus discomfort  Dermatologic: Negative for new skin conditions, rash  Respiratory: Negative for wheezing, shortness of breath, cough  Cardiovascular: Negative for chest pain, palpitations, or leg swelling  Gastrointestinal: see hpi  Genitourinary NEGATIVE FOR URINARY INCONTINENCE urgency , frequency, discomfort   Musculoskeletal: see hpi  Neurological: Negative for headaches, dizziness    Previous history  Past Medical History:   Diagnosis Date    Chest pain, unspecified 03/21/2018    Chest pain    Encounter for other screening for malignant neoplasm of breast 10/23/2020    Breast screening    Encounter for screening for malignant neoplasm of colon 01/12/2021    Colon cancer screening    Excessive and frequent menstruation with regular cycle     Menorrhagia with regular cycle    Hyperlipidemia, unspecified 10/28/2021    Dyslipidemia (high LDL; low HDL)    Palpitations 03/21/2018    Palpitations    Personal history of other infectious and parasitic diseases 06/14/2016     History of Helicobacter infection     Past Surgical History:   Procedure Laterality Date    BACK SURGERY  10/14/2016    Back Surgery    COLONOSCOPY  06/14/2016    Complete Colonoscopy    HYSTEROSCOPY  02/15/2018    Hysteroscopy With Endometrial Ablation    OTHER SURGICAL HISTORY  03/02/2022    Dilation and curettage    SHOULDER SURGERY  10/14/2016    Shoulder Surgery     Social History     Tobacco Use    Smoking status: Never     Passive exposure: Never    Smokeless tobacco: Never   Vaping Use    Vaping Use: Never used   Substance Use Topics    Alcohol use: Yes     Alcohol/week: 2.0 standard drinks of alcohol     Types: 2 Glasses of wine per week    Drug use: Never     Family History   Problem Relation Name Age of Onset    Migraines Mother      Colonic polyp Father      Other (abdominal aneurysm) Brother      Ulcerative colitis Paternal Grandfather      Colonic polyp Paternal Grandfather      Ulcerative colitis Other sibling     Breast cancer Mother's Sister       Allergies   Allergen Reactions    Humira [Adalimumab] Rash     Current Outpatient Medications   Medication Instructions    Actemra ACTPen 162 mg, subcutaneous, Every 14 days    albuterol (ProAir HFA) 90 mcg/actuation inhaler 1-2 puffs, inhalation, Every 4 hours PRN    montelukast (SINGULAIR) 10 mg, oral, Daily PRN    omeprazole (PRILOSEC) 40 mg, oral, Daily before breakfast    triamcinolone (Nasacort) 55 mcg nasal inhaler 2 sprays, Each Nostril, Daily       Objective       Physical Exam  Vital Signs: as recorded above  General: Well groomed, well nourished   Orientation:  Alert , oriented to time, place , and person   Mood and Affect:  Cooperative , no apparent distress normal affect  Skin: Good color, good turgor  Eyes: Extra ocular muscle movements intact, anicteric sclerae  Neck: Supple, full range of movement  Chest: Normal breath sounds, normal chest wall exam, symmetric, good air entry, clear to auscultation  Heart: Regular rate and rhythm, without  murmur, gallop, or rubs  Abdomen soft nontender no masses felt no hepatosplenomegaly, no rebound or guarding  BACK:  no CTLS spine tenderness, no flank tenderness  Extremities: full range of movement  bilateral UE and bilateral LE,  no lower extremity edema  Neurological: Alert, oriented, cranial nerves II-XII intact except for visual acuity  Sensation:  Intact   Gait: normal steady      Assessment/Plan   Sarika Figueroa is a 53 y.o. female who presents for the concerns below:    Problem List Items Addressed This Visit    None    Abdominal pain/nausea/vomiting/loose stools -  two episodes 10 or so days apart resolved by itself , latest episode zofran helped her sleep   Some hypogastric discomfort no urinary sxs , will check urinalysis, she has standing orders from Dr Bran and is due for that she will get labwork today   Zofran sent to pharmacy prn use does sedate so take only if home and not driving   Ice chips next time and continue pedialyte plan if it recurs. Also she will let me know if it recurs will consider initally pelvic and abd ultrasound but if worse episode will do ct scan .  Does not follow diverticulitis usual hx since it lasted 2 days at most  Ovarian torsion??  Was afebrile but did have chills    Rheumatoid arthritis on actemra so immunocompromised , low tolerance for imaging if it recurs     Discussed with:   Return in : due for cpe     Portions of this note were generated using digital voice recognition software, and may contain grammatical errors       Ami Ramirez MD  01/11/24  8:50 AM

## 2024-01-12 ENCOUNTER — CLINICAL SUPPORT (OUTPATIENT)
Dept: ALLERGY | Facility: CLINIC | Age: 54
End: 2024-01-12
Payer: COMMERCIAL

## 2024-01-12 DIAGNOSIS — J30.9 ALLERGIC RHINITIS, UNSPECIFIED SEASONALITY, UNSPECIFIED TRIGGER: ICD-10-CM

## 2024-01-12 PROCEDURE — 95117 IMMUNOTHERAPY INJECTIONS: CPT | Performed by: ALLERGY & IMMUNOLOGY

## 2024-01-12 NOTE — RESULT ENCOUNTER NOTE
I have reviewed your blood work from 1/11/2024.    Your comprehensive metabolic panel showed normal liver and kidney functions.  Your complete blood count was normal without any anemia.    Your inflammatory markers were normal.

## 2024-01-16 ENCOUNTER — SPECIALTY PHARMACY (OUTPATIENT)
Dept: PHARMACY | Facility: CLINIC | Age: 54
End: 2024-01-16

## 2024-01-16 PROCEDURE — RXMED WILLOW AMBULATORY MEDICATION CHARGE

## 2024-01-17 ENCOUNTER — PHARMACY VISIT (OUTPATIENT)
Dept: PHARMACY | Facility: CLINIC | Age: 54
End: 2024-01-17
Payer: COMMERCIAL

## 2024-01-19 ENCOUNTER — APPOINTMENT (OUTPATIENT)
Dept: ALLERGY | Facility: CLINIC | Age: 54
End: 2024-01-19
Payer: COMMERCIAL

## 2024-01-22 ENCOUNTER — APPOINTMENT (OUTPATIENT)
Dept: RADIOLOGY | Facility: HOSPITAL | Age: 54
End: 2024-01-22
Payer: COMMERCIAL

## 2024-01-22 ENCOUNTER — HOSPITAL ENCOUNTER (EMERGENCY)
Facility: HOSPITAL | Age: 54
Discharge: HOME | End: 2024-01-23
Attending: STUDENT IN AN ORGANIZED HEALTH CARE EDUCATION/TRAINING PROGRAM
Payer: COMMERCIAL

## 2024-01-22 DIAGNOSIS — N20.1 URETERAL STONE: Primary | ICD-10-CM

## 2024-01-22 LAB
ALBUMIN SERPL BCP-MCNC: 4.5 G/DL (ref 3.4–5)
ALP SERPL-CCNC: 53 U/L (ref 33–110)
ALT SERPL W P-5'-P-CCNC: 17 U/L (ref 7–45)
ANION GAP SERPL CALC-SCNC: 11 MMOL/L (ref 10–20)
APPEARANCE UR: CLEAR
AST SERPL W P-5'-P-CCNC: 21 U/L (ref 9–39)
BASOPHILS # BLD AUTO: 0.04 X10*3/UL (ref 0–0.1)
BASOPHILS NFR BLD AUTO: 0.4 %
BILIRUB SERPL-MCNC: 0.7 MG/DL (ref 0–1.2)
BILIRUB UR STRIP.AUTO-MCNC: NEGATIVE MG/DL
BUN SERPL-MCNC: 16 MG/DL (ref 6–23)
CALCIUM SERPL-MCNC: 9.5 MG/DL (ref 8.6–10.3)
CHLORIDE SERPL-SCNC: 100 MMOL/L (ref 98–107)
CO2 SERPL-SCNC: 32 MMOL/L (ref 21–32)
COLOR UR: YELLOW
CREAT SERPL-MCNC: 0.85 MG/DL (ref 0.5–1.05)
EGFRCR SERPLBLD CKD-EPI 2021: 82 ML/MIN/1.73M*2
EOSINOPHIL # BLD AUTO: 0.07 X10*3/UL (ref 0–0.7)
EOSINOPHIL NFR BLD AUTO: 0.8 %
ERYTHROCYTE [DISTWIDTH] IN BLOOD BY AUTOMATED COUNT: 11.7 % (ref 11.5–14.5)
GLUCOSE SERPL-MCNC: 91 MG/DL (ref 74–99)
GLUCOSE UR STRIP.AUTO-MCNC: NEGATIVE MG/DL
HCT VFR BLD AUTO: 43.3 % (ref 36–46)
HGB BLD-MCNC: 14.3 G/DL (ref 12–16)
IMM GRANULOCYTES # BLD AUTO: 0.12 X10*3/UL (ref 0–0.7)
IMM GRANULOCYTES NFR BLD AUTO: 1.3 % (ref 0–0.9)
KETONES UR STRIP.AUTO-MCNC: NEGATIVE MG/DL
LACTATE SERPL-SCNC: 1.1 MMOL/L (ref 0.4–2)
LEUKOCYTE ESTERASE UR QL STRIP.AUTO: ABNORMAL
LIPASE SERPL-CCNC: 25 U/L (ref 9–82)
LYMPHOCYTES # BLD AUTO: 0.79 X10*3/UL (ref 1.2–4.8)
LYMPHOCYTES NFR BLD AUTO: 8.7 %
MCH RBC QN AUTO: 31.5 PG (ref 26–34)
MCHC RBC AUTO-ENTMCNC: 33 G/DL (ref 32–36)
MCV RBC AUTO: 95 FL (ref 80–100)
MONOCYTES # BLD AUTO: 0.3 X10*3/UL (ref 0.1–1)
MONOCYTES NFR BLD AUTO: 3.3 %
MUCOUS THREADS #/AREA URNS AUTO: NORMAL /LPF
NEUTROPHILS # BLD AUTO: 7.77 X10*3/UL (ref 1.2–7.7)
NEUTROPHILS NFR BLD AUTO: 85.5 %
NITRITE UR QL STRIP.AUTO: NEGATIVE
NRBC BLD-RTO: 0 /100 WBCS (ref 0–0)
PH UR STRIP.AUTO: 7 [PH]
PLATELET # BLD AUTO: 264 X10*3/UL (ref 150–450)
POTASSIUM SERPL-SCNC: 4.2 MMOL/L (ref 3.5–5.3)
PROT SERPL-MCNC: 7.3 G/DL (ref 6.4–8.2)
PROT UR STRIP.AUTO-MCNC: NEGATIVE MG/DL
RBC # BLD AUTO: 4.54 X10*6/UL (ref 4–5.2)
RBC # UR STRIP.AUTO: ABNORMAL /UL
RBC #/AREA URNS AUTO: NORMAL /HPF
SODIUM SERPL-SCNC: 139 MMOL/L (ref 136–145)
SP GR UR STRIP.AUTO: 1.01
UROBILINOGEN UR STRIP.AUTO-MCNC: <2 MG/DL
WBC # BLD AUTO: 9.1 X10*3/UL (ref 4.4–11.3)
WBC #/AREA URNS AUTO: NORMAL /HPF

## 2024-01-22 PROCEDURE — 99284 EMERGENCY DEPT VISIT MOD MDM: CPT | Performed by: STUDENT IN AN ORGANIZED HEALTH CARE EDUCATION/TRAINING PROGRAM

## 2024-01-22 PROCEDURE — 2500000004 HC RX 250 GENERAL PHARMACY W/ HCPCS (ALT 636 FOR OP/ED): Performed by: EMERGENCY MEDICINE

## 2024-01-22 PROCEDURE — 36415 COLL VENOUS BLD VENIPUNCTURE: CPT | Performed by: STUDENT IN AN ORGANIZED HEALTH CARE EDUCATION/TRAINING PROGRAM

## 2024-01-22 PROCEDURE — 74177 CT ABD & PELVIS W/CONTRAST: CPT

## 2024-01-22 PROCEDURE — 96360 HYDRATION IV INFUSION INIT: CPT | Mod: 59

## 2024-01-22 PROCEDURE — 83605 ASSAY OF LACTIC ACID: CPT | Performed by: STUDENT IN AN ORGANIZED HEALTH CARE EDUCATION/TRAINING PROGRAM

## 2024-01-22 PROCEDURE — 2500000001 HC RX 250 WO HCPCS SELF ADMINISTERED DRUGS (ALT 637 FOR MEDICARE OP): Performed by: GENERAL PRACTICE

## 2024-01-22 PROCEDURE — 80053 COMPREHEN METABOLIC PANEL: CPT | Performed by: STUDENT IN AN ORGANIZED HEALTH CARE EDUCATION/TRAINING PROGRAM

## 2024-01-22 PROCEDURE — 85025 COMPLETE CBC W/AUTO DIFF WBC: CPT | Performed by: STUDENT IN AN ORGANIZED HEALTH CARE EDUCATION/TRAINING PROGRAM

## 2024-01-22 PROCEDURE — 83690 ASSAY OF LIPASE: CPT | Performed by: STUDENT IN AN ORGANIZED HEALTH CARE EDUCATION/TRAINING PROGRAM

## 2024-01-22 PROCEDURE — 2500000004 HC RX 250 GENERAL PHARMACY W/ HCPCS (ALT 636 FOR OP/ED): Performed by: GENERAL PRACTICE

## 2024-01-22 PROCEDURE — 99284 EMERGENCY DEPT VISIT MOD MDM: CPT | Mod: 25

## 2024-01-22 PROCEDURE — 81001 URINALYSIS AUTO W/SCOPE: CPT | Performed by: STUDENT IN AN ORGANIZED HEALTH CARE EDUCATION/TRAINING PROGRAM

## 2024-01-22 PROCEDURE — 96361 HYDRATE IV INFUSION ADD-ON: CPT

## 2024-01-22 PROCEDURE — 2550000001 HC RX 255 CONTRASTS: Performed by: STUDENT IN AN ORGANIZED HEALTH CARE EDUCATION/TRAINING PROGRAM

## 2024-01-22 PROCEDURE — 74177 CT ABD & PELVIS W/CONTRAST: CPT | Performed by: RADIOLOGY

## 2024-01-22 RX ORDER — CEPHALEXIN 500 MG/1
500 CAPSULE ORAL ONCE
Status: COMPLETED | OUTPATIENT
Start: 2024-01-22 | End: 2024-01-22

## 2024-01-22 RX ORDER — CEPHALEXIN 500 MG/1
500 CAPSULE ORAL 2 TIMES DAILY
Qty: 10 CAPSULE | Refills: 0 | Status: SHIPPED | OUTPATIENT
Start: 2024-01-22 | End: 2024-01-27

## 2024-01-22 RX ORDER — TAMSULOSIN HYDROCHLORIDE 0.4 MG/1
0.4 CAPSULE ORAL DAILY
Qty: 5 CAPSULE | Refills: 0 | Status: SHIPPED | OUTPATIENT
Start: 2024-01-22 | End: 2024-01-27

## 2024-01-22 RX ORDER — TAMSULOSIN HYDROCHLORIDE 0.4 MG/1
0.4 CAPSULE ORAL ONCE
Status: COMPLETED | OUTPATIENT
Start: 2024-01-22 | End: 2024-01-22

## 2024-01-22 RX ORDER — MORPHINE SULFATE 4 MG/ML
4 INJECTION, SOLUTION INTRAMUSCULAR; INTRAVENOUS EVERY 30 MIN PRN
Status: DISCONTINUED | OUTPATIENT
Start: 2024-01-22 | End: 2024-01-23 | Stop reason: HOSPADM

## 2024-01-22 RX ORDER — ONDANSETRON HYDROCHLORIDE 2 MG/ML
4 INJECTION, SOLUTION INTRAVENOUS EVERY 30 MIN PRN
Status: DISCONTINUED | OUTPATIENT
Start: 2024-01-22 | End: 2024-01-23 | Stop reason: HOSPADM

## 2024-01-22 RX ORDER — KETOROLAC TROMETHAMINE 10 MG/1
10 TABLET, FILM COATED ORAL EVERY 8 HOURS PRN
Qty: 20 TABLET | Refills: 0 | Status: SHIPPED | OUTPATIENT
Start: 2024-01-22 | End: 2024-01-27

## 2024-01-22 RX ADMIN — IOHEXOL 75 ML: 350 INJECTION, SOLUTION INTRAVENOUS at 21:32

## 2024-01-22 RX ADMIN — SODIUM CHLORIDE 1000 ML: 9 INJECTION, SOLUTION INTRAVENOUS at 19:30

## 2024-01-22 RX ADMIN — CEPHALEXIN 500 MG: 500 CAPSULE ORAL at 23:58

## 2024-01-22 RX ADMIN — TAMSULOSIN HYDROCHLORIDE 0.4 MG: 0.4 CAPSULE ORAL at 23:58

## 2024-01-22 ASSESSMENT — PAIN DESCRIPTION - LOCATION: LOCATION: ABDOMEN

## 2024-01-22 ASSESSMENT — PAIN DESCRIPTION - ORIENTATION: ORIENTATION: LOWER

## 2024-01-22 ASSESSMENT — PAIN SCALES - GENERAL: PAINLEVEL_OUTOF10: 8

## 2024-01-22 ASSESSMENT — PAIN - FUNCTIONAL ASSESSMENT: PAIN_FUNCTIONAL_ASSESSMENT: 0-10

## 2024-01-22 NOTE — ED PROVIDER NOTES
EMERGENCY MEDICINE EVALUATION NOTE    History of Present Illness     Chief Complaint:   Chief Complaint   Patient presents with    Abdominal Pain       HPI: Sarika Figueroa is a 53 y.o. female with past medical history of dyslipidemia who presents with complaint of abdominal pain, nausea, and vomiting.  Patient states starting this afternoon she developed bilateral lower quadrant abdominal pain pain involving her left lower quadrant.  He states a constant dull sensation.  She also admits nausea with mild episodes of nonbloody nonbilious emesis prior to arrival.  She denies any change in bowel movements such as diarrhea, constipation, hematochezia.  Denies any dysuria hematuria vaginal bleeding or discharge.  She states 2 similar episodes happening several weeks ago which resolved spontaneously.  She does admit taking Zofran at home with no relief.    Previous History     Past Medical History:   Diagnosis Date    Chest pain, unspecified 03/21/2018    Chest pain    Encounter for other screening for malignant neoplasm of breast 10/23/2020    Breast screening    Encounter for screening for malignant neoplasm of colon 01/12/2021    Colon cancer screening    Excessive and frequent menstruation with regular cycle     Menorrhagia with regular cycle    Hyperlipidemia, unspecified 10/28/2021    Dyslipidemia (high LDL; low HDL)    Palpitations 03/21/2018    Palpitations    Personal history of other infectious and parasitic diseases 06/14/2016    History of Helicobacter infection     Past Surgical History:   Procedure Laterality Date    BACK SURGERY  10/14/2016    Back Surgery    COLONOSCOPY  06/14/2016    Complete Colonoscopy    HYSTEROSCOPY  02/15/2018    Hysteroscopy With Endometrial Ablation    OTHER SURGICAL HISTORY  03/02/2022    Dilation and curettage    SHOULDER SURGERY  10/14/2016    Shoulder Surgery     Social History     Tobacco Use    Smoking status: Never     Passive exposure: Never    Smokeless tobacco: Never    Vaping Use    Vaping Use: Never used   Substance Use Topics    Alcohol use: Yes     Alcohol/week: 2.0 standard drinks of alcohol     Types: 2 Glasses of wine per week    Drug use: Never     Family History   Problem Relation Name Age of Onset    Migraines Mother      Colonic polyp Father      Other (abdominal aneurysm) Brother      Ulcerative colitis Paternal Grandfather      Colonic polyp Paternal Grandfather      Ulcerative colitis Other sibling     Breast cancer Mother's Sister       Allergies   Allergen Reactions    Humira [Adalimumab] Rash     Current Outpatient Medications   Medication Instructions    Actemra ACTPen 162 mg, subcutaneous, Every 14 days    albuterol (ProAir HFA) 90 mcg/actuation inhaler 1-2 puffs, inhalation, Every 4 hours PRN    montelukast (SINGULAIR) 10 mg, oral, Daily PRN    triamcinolone (Nasacort) 55 mcg nasal inhaler 2 sprays, Each Nostril, Daily       Physical Exam     Appearance: Alert, oriented , cooperative,  in acute distress. Well nourished & well hydrated.     Skin: Intact,  dry skin, no lesions, rash, petechiae or purpura.      Eyes: PERRLA, EOMs intact,  Conjunctiva pink with no redness or exudates. Cornea & anterior chamber are clear, Eyelids without lesions. No scleral icterus.      ENT: Hearing grossly intact. External auditory canals patent, tympanic membranes intact with visible landmarks. Nares patent, mucus membranes moist. Dentition without lesions. Pharynx clear, uvula midline.      Neck: Supple, without meningismus. Thyroid not palpable. Trachea at midline. No lymphadenopathy.     Pulmonary: Clear bilaterally with good chest wall excursion. No rales, rhonchi or wheezing. No accessory muscle use or stridor.     Cardiac: Normal S1, S2 without murmur, rub, gallop or extrasystole. No JVD, Carotids without bruits.     Abdomen: Soft, moderate tenderness to deep palpation in the bilateral lower quadrants mainly involving the left lower quadrant, active bowel sounds.  No  palpable organomegaly.  No rebound or guarding.  No CVA tenderness.     Genitourinary: Exam deferred.     Musculoskeletal: Full range of motion. no pain, edema, or deformity. Pulses full and equal. No cyanosis or clubbing.      Neurological:  Cranial nerves II through XII are grossly intact, finger-nose touch is normal, normal sensation, no weakness, no focal findings identified.     Psychiatric: Appropriate mood and affect.      Results     Labs Reviewed   URINALYSIS WITH REFLEX MICROSCOPIC - Abnormal       Result Value    Color, Urine Yellow      Appearance, Urine Clear      Specific Gravity, Urine 1.010      pH, Urine 7.0      Protein, Urine NEGATIVE      Glucose, Urine NEGATIVE      Blood, Urine SMALL (1+) (*)     Ketones, Urine NEGATIVE      Bilirubin, Urine NEGATIVE      Urobilinogen, Urine <2.0      Nitrite, Urine NEGATIVE      Leukocyte Esterase, Urine TRACE (*)    CBC WITH AUTO DIFFERENTIAL - Abnormal    WBC 9.1      nRBC 0.0      RBC 4.54      Hemoglobin 14.3      Hematocrit 43.3      MCV 95      MCH 31.5      MCHC 33.0      RDW 11.7      Platelets 264      Neutrophils % 85.5      Immature Granulocytes %, Automated 1.3 (*)     Lymphocytes % 8.7      Monocytes % 3.3      Eosinophils % 0.8      Basophils % 0.4      Neutrophils Absolute 7.77 (*)     Immature Granulocytes Absolute, Automated 0.12      Lymphocytes Absolute 0.79 (*)     Monocytes Absolute 0.30      Eosinophils Absolute 0.07      Basophils Absolute 0.04     COMPREHENSIVE METABOLIC PANEL - Normal    Glucose 91      Sodium 139      Potassium 4.2      Chloride 100      Bicarbonate 32      Anion Gap 11      Urea Nitrogen 16      Creatinine 0.85      eGFR 82      Calcium 9.5      Albumin 4.5      Alkaline Phosphatase 53      Total Protein 7.3      AST 21      Bilirubin, Total 0.7      ALT 17     LIPASE - Normal    Lipase 25      Narrative:     Venipuncture immediately after or during the administration of Metamizole may lead to falsely low results.  Testing should be performed immediately prior to Metamizole dosing.   LACTATE - Normal    Lactate 1.1      Narrative:     Venipuncture immediately after or during the administration of Metamizole may lead to falsely low results. Testing should be performed immediately  prior to Metamizole dosing.   MICROSCOPIC ONLY, URINE    WBC, Urine 1-5      RBC, Urine 1-2      Mucus, Urine 3+       CT abdomen pelvis w IV contrast   Final Result   1. Moderate left hydroureteronephrosis secondary to 0.5 cm   obstructing left renal calculus. Urothelial thickening/enhancement   and bladder wall thickening may be secondary to superimposed urinary   tract infection. Please correlate with urinalysis.   2. Additional nonobstructing left renal calculi measuring up to 2 mm.   3. Cholelithiasis.   4. 1.3 x 4 x 4.8 cm right lower paraspinal fluid collection with   surrounding surgical clips and adjacent osseous irregularity. The   collection could represent a chronic postoperative seroma, though   superimposed infection is not excluded. Please correlate with   surgical history. Comparison with any available outside prior imaging   is recommended. Alternatively MRI with contrast could be considered   for further evaluation, particularly of the osseous findings.   5. Indeterminate subtle hypodense foci within the superior spleen   measuring up to 1 cm.        Signed by: Willie Acevedo 1/22/2024 10:29 PM   Dictation workstation:   [id number]            ED Course & Medical Decision Making     Medications   sodium chloride 0.9 % bolus 1,000 mL (0 mL intravenous Stopped 1/22/24 2113)   iohexol (OMNIPaque) 350 mg iodine/mL solution 75 mL (75 mL intravenous Given 1/22/24 2132)   cephalexin (Keflex) capsule 500 mg (500 mg oral Given 1/22/24 2358)   tamsulosin (Flomax) 24 hr capsule 0.4 mg (0.4 mg oral Given 1/22/24 2358)     Diagnoses as of 01/28/24 1337   Ureteral stone           Sarika Figueroa is a 53 y.o. female with past medical  history of dyslipidemia who presents with complaint of abdominal pain, nausea, and vomiting.  Patient was hemodynamically stable and afebrile initial examination.  She does have moderate tenderness in the bilateral lower quadrants mainly involve the left lower quadrant initial examination.  Differential does include but is not limited to acute diverticulitis, ovarian torsion, cystic rupture, malignancy, nephrolithiasis.  Patient initially treated with 1 L normal saline.  She did refuse pain medication currently initial exam.  Lab work reassuring with normal renal function no significant leukocytosis, anemia, electrolyte abnormality.  LFTs and lipase normal.  Lactate normal.  Patient still pending urinalysis as well as CT scan of the abdomen pelvis for further evaluation was signed out to oncoming attending pending these findings.    Procedures   Procedures    Diagnosis     1. Ureteral stone        Disposition     Signed out to oncoming attending pending final disposition    ED Prescriptions       Medication Sig Dispense Start Date End Date Auth. Provider    cephalexin (Keflex) 500 mg capsule () Take 1 capsule (500 mg) by mouth 2 times a day for 5 days. 10 capsule 2024 Senthil Espinosa DO    tamsulosin (Flomax) 0.4 mg 24 hr capsule () Take 1 capsule (0.4 mg) by mouth once daily for 5 days. 5 capsule 2024 Senthil Espinosa DO    ketorolac (Toradol) 10 mg tablet () Take 1 tablet (10 mg) by mouth every 8 hours if needed for moderate pain (4 - 6) for up to 5 days. 20 tablet 2024 DO Dima Costa DO  24 3651

## 2024-01-22 NOTE — ED TRIAGE NOTES
"C/O LOWER ABD PAIN/N/V, DENIES DYSURIA/HEMATURIA/HEMATOCHEZIA/HEMATEMESIS, DENIES LOWER BACK PAIN, DENIES CP/SOB/PALPITATIONS/D/F/CHILLS, PT STATES \"THIS HAPPENED BEFORE AND MY DR SAID NIT MIGHT OF BEEN GLUTEN\"    "

## 2024-01-23 ENCOUNTER — TELEPHONE (OUTPATIENT)
Dept: PRIMARY CARE | Facility: CLINIC | Age: 54
End: 2024-01-23
Payer: COMMERCIAL

## 2024-01-23 VITALS
HEART RATE: 75 BPM | OXYGEN SATURATION: 98 % | WEIGHT: 135 LBS | RESPIRATION RATE: 16 BRPM | DIASTOLIC BLOOD PRESSURE: 59 MMHG | SYSTOLIC BLOOD PRESSURE: 103 MMHG | HEIGHT: 65 IN | BODY MASS INDEX: 22.49 KG/M2 | TEMPERATURE: 97.9 F

## 2024-01-23 NOTE — ED PROVIDER NOTES
Patient signed out to me by the daytime provider.  Please see his note for complete history and physical.  The patient CT shows a 0.5 cm stone at the left UVJ.  There is also a right lower paraspinal fluid collection.  She does have a remote history of tumor removal in this area and states that she always has mild pain with palpation in this area.  She is denying fever and this most likely represents a chronic postoperative seroma.  She was provided with a urology referral and prescriptions for Toradol and Flomax.  Urinalysis shows trace leukocyte Estrace without elevated white blood cells.  She was provided also with a prescription for Keflex x 5 days.  She will follow-up with urology and her primary care physician as an outpatient and will return to the ED for any concerning symptoms.  She was provided with a copy of her CT report.    Labs Reviewed   URINALYSIS WITH REFLEX MICROSCOPIC - Abnormal       Result Value    Color, Urine Yellow      Appearance, Urine Clear      Specific Gravity, Urine 1.010      pH, Urine 7.0      Protein, Urine NEGATIVE      Glucose, Urine NEGATIVE      Blood, Urine SMALL (1+) (*)     Ketones, Urine NEGATIVE      Bilirubin, Urine NEGATIVE      Urobilinogen, Urine <2.0      Nitrite, Urine NEGATIVE      Leukocyte Esterase, Urine TRACE (*)    CBC WITH AUTO DIFFERENTIAL - Abnormal    WBC 9.1      nRBC 0.0      RBC 4.54      Hemoglobin 14.3      Hematocrit 43.3      MCV 95      MCH 31.5      MCHC 33.0      RDW 11.7      Platelets 264      Neutrophils % 85.5      Immature Granulocytes %, Automated 1.3 (*)     Lymphocytes % 8.7      Monocytes % 3.3      Eosinophils % 0.8      Basophils % 0.4      Neutrophils Absolute 7.77 (*)     Immature Granulocytes Absolute, Automated 0.12      Lymphocytes Absolute 0.79 (*)     Monocytes Absolute 0.30      Eosinophils Absolute 0.07      Basophils Absolute 0.04     COMPREHENSIVE METABOLIC PANEL - Normal    Glucose 91      Sodium 139      Potassium 4.2       Chloride 100      Bicarbonate 32      Anion Gap 11      Urea Nitrogen 16      Creatinine 0.85      eGFR 82      Calcium 9.5      Albumin 4.5      Alkaline Phosphatase 53      Total Protein 7.3      AST 21      Bilirubin, Total 0.7      ALT 17     LIPASE - Normal    Lipase 25      Narrative:     Venipuncture immediately after or during the administration of Metamizole may lead to falsely low results. Testing should be performed immediately prior to Metamizole dosing.   LACTATE - Normal    Lactate 1.1      Narrative:     Venipuncture immediately after or during the administration of Metamizole may lead to falsely low results. Testing should be performed immediately  prior to Metamizole dosing.   MICROSCOPIC ONLY, URINE    WBC, Urine 1-5      RBC, Urine 1-2      Mucus, Urine 3+       CT abdomen pelvis w IV contrast   Final Result   1. Moderate left hydroureteronephrosis secondary to 0.5 cm   obstructing left renal calculus. Urothelial thickening/enhancement   and bladder wall thickening may be secondary to superimposed urinary   tract infection. Please correlate with urinalysis.   2. Additional nonobstructing left renal calculi measuring up to 2 mm.   3. Cholelithiasis.   4. 1.3 x 4 x 4.8 cm right lower paraspinal fluid collection with   surrounding surgical clips and adjacent osseous irregularity. The   collection could represent a chronic postoperative seroma, though   superimposed infection is not excluded. Please correlate with   surgical history. Comparison with any available outside prior imaging   is recommended. Alternatively MRI with contrast could be considered   for further evaluation, particularly of the osseous findings.   5. Indeterminate subtle hypodense foci within the superior spleen   measuring up to 1 cm.        Signed by: Willie Acevedo 1/22/2024 10:29 PM   Dictation workstation:   [id number]             Senthil Espinosa DO  01/22/24 0496

## 2024-01-23 NOTE — TELEPHONE ENCOUNTER
Called Pt several times.  Left message relaying info.  LG      ----- Message from Ami Ramirez MD sent at 1/22/2024  2:46 PM EST -----  Regarding: FW: Abdominal cramping  Contact: [phone]  If vomiting does not stop , would go to ER  rommel if she took zofran also  ty aq   ----- Message -----  From: Betty Raymundo  Sent: 1/22/2024   1:42 PM EST  To: Ami Ramirez MD  Subject: FW: Abdominal cramping                             ----- Message -----  From: Sarika Figueroa  Sent: 1/22/2024   1:35 PM EST  To: [facility] [place] [id number] Clinical Support Staff  Subject: Abdominal cramping                               Dr Panda,  Cramping and pain starting again about 2 hours ago. I took zofran as preventative and so I have no nausea but pain not getting any better, worsening as time goes on.   I have not had any gluten that I am aware of and didn’t eat all morning. Ate after cramping started when mild.around 12pm today.   For awareness, had 2 large bm but normal in appearance this morning.  Please let me know what I should do.  Thank you

## 2024-01-25 ENCOUNTER — OFFICE VISIT (OUTPATIENT)
Dept: OPHTHALMOLOGY | Facility: CLINIC | Age: 54
End: 2024-01-25
Payer: COMMERCIAL

## 2024-01-25 DIAGNOSIS — H01.02B SQUAMOUS BLEPHARITIS OF UPPER AND LOWER EYELIDS OF BOTH EYES: ICD-10-CM

## 2024-01-25 DIAGNOSIS — M06.9 RHEUMATOID ARTHRITIS, INVOLVING UNSPECIFIED SITE, UNSPECIFIED WHETHER RHEUMATOID FACTOR PRESENT (MULTI): Primary | ICD-10-CM

## 2024-01-25 DIAGNOSIS — H52.4 MYOPIA WITH PRESBYOPIA OF BOTH EYES: ICD-10-CM

## 2024-01-25 DIAGNOSIS — H52.13 MYOPIA WITH PRESBYOPIA OF BOTH EYES: ICD-10-CM

## 2024-01-25 DIAGNOSIS — H01.02A SQUAMOUS BLEPHARITIS OF UPPER AND LOWER EYELIDS OF BOTH EYES: ICD-10-CM

## 2024-01-25 PROCEDURE — 92012 INTRM OPH EXAM EST PATIENT: CPT | Performed by: OPTOMETRIST

## 2024-01-25 PROCEDURE — 92310 CONTACT LENS FITTING OU: CPT | Performed by: OPTOMETRIST

## 2024-01-25 PROCEDURE — V2520 CONTACT LENS HYDROPHILIC: HCPCS | Performed by: OPTOMETRIST

## 2024-01-25 PROCEDURE — 92015 DETERMINE REFRACTIVE STATE: CPT | Performed by: OPTOMETRIST

## 2024-01-25 ASSESSMENT — REFRACTION_MANIFEST
OD_ADD: +2.00
OD_AXIS: 120
OS_CYLINDER: SPHERE
OD_SPHERE: -2.25
OS_ADD: +2.00
OS_SPHERE: -1.75
OD_CYLINDER: -0.25

## 2024-01-25 ASSESSMENT — REFRACTION_WEARINGRX
OD_AXIS: 095
OS_CYLINDER: SPHERE
OS_SPHERE: -1.75
OD_CYLINDER: -0.25
OD_SPHERE: -2.00
OD_ADD: +2.25
SPECS_TYPE: PAL
OS_ADD: +2.25

## 2024-01-25 ASSESSMENT — REFRACTION_CURRENTRX
OD_DIAMETER: 14.2
OD_BASECURVE: 8.5
OD_SPHERE: -2.00
OD_CYLINDER: SPHERE
OD_BRAND: ACUVUE 1 DAY MOIST

## 2024-01-25 ASSESSMENT — TONOMETRY
OS_IOP_MMHG: 11
IOP_METHOD: TONOPEN
OD_IOP_MMHG: 10

## 2024-01-25 ASSESSMENT — EXTERNAL EXAM - LEFT EYE: OS_EXAM: NORMAL

## 2024-01-25 ASSESSMENT — CONF VISUAL FIELD
OS_SUPERIOR_NASAL_RESTRICTION: 0
OD_SUPERIOR_TEMPORAL_RESTRICTION: 0
OD_INFERIOR_TEMPORAL_RESTRICTION: 0
OS_SUPERIOR_TEMPORAL_RESTRICTION: 0
OS_INFERIOR_NASAL_RESTRICTION: 0
OD_INFERIOR_NASAL_RESTRICTION: 0
OD_NORMAL: 1
OS_INFERIOR_TEMPORAL_RESTRICTION: 0
OS_NORMAL: 1
OD_SUPERIOR_NASAL_RESTRICTION: 0

## 2024-01-25 ASSESSMENT — ENCOUNTER SYMPTOMS
ALLERGIC/IMMUNOLOGIC NEGATIVE: 0
GASTROINTESTINAL NEGATIVE: 0
CARDIOVASCULAR NEGATIVE: 0
RESPIRATORY NEGATIVE: 0
PSYCHIATRIC NEGATIVE: 0
NEUROLOGICAL NEGATIVE: 0
MUSCULOSKELETAL NEGATIVE: 0
EYES NEGATIVE: 1
HEMATOLOGIC/LYMPHATIC NEGATIVE: 0
ENDOCRINE NEGATIVE: 0
CONSTITUTIONAL NEGATIVE: 0

## 2024-01-25 ASSESSMENT — VISUAL ACUITY
CORRECTION_TYPE: GLASSES
OS_CC: 20/20
METHOD: SNELLEN - LINEAR
OD_CC: 20/20

## 2024-01-25 ASSESSMENT — SLIT LAMP EXAM - LIDS
COMMENTS: GOOD POSITION, 1+ MGD
COMMENTS: GOOD POSITION, 1+ MGD

## 2024-01-25 ASSESSMENT — CUP TO DISC RATIO
OD_RATIO: .35
OS_RATIO: .35

## 2024-01-25 ASSESSMENT — EXTERNAL EXAM - RIGHT EYE: OD_EXAM: NORMAL

## 2024-01-25 NOTE — PROGRESS NOTES
PVD OD last summer. No new F and F. The patient was asked to return to our clinic or seek out eye care ASAP if new flashes of light or floaters are noted.   Deferred dilation today.   A spectacle prescription was dispensed to be used as needed. A contact lens prescription was dispensed at the patient's request. [facility] only wears lens in the right eye. Order placed as well.   The patient was asked to return to our clinic in one year or sooner if ocular or vision changes occur.

## 2024-01-26 ENCOUNTER — CLINICAL SUPPORT (OUTPATIENT)
Dept: ALLERGY | Facility: CLINIC | Age: 54
End: 2024-01-26
Payer: COMMERCIAL

## 2024-01-26 DIAGNOSIS — J30.2 SEASONAL ALLERGIES: ICD-10-CM

## 2024-01-26 PROCEDURE — 95117 IMMUNOTHERAPY INJECTIONS: CPT | Performed by: ALLERGY & IMMUNOLOGY

## 2024-01-29 PROBLEM — J30.2 SEASONAL ALLERGIES: Status: ACTIVE | Noted: 2024-01-29

## 2024-01-31 ENCOUNTER — OFFICE VISIT (OUTPATIENT)
Dept: UROLOGY | Facility: CLINIC | Age: 54
End: 2024-01-31
Payer: COMMERCIAL

## 2024-01-31 VITALS — BODY MASS INDEX: 22.82 KG/M2 | WEIGHT: 137 LBS | TEMPERATURE: 97.8 F | HEIGHT: 65 IN

## 2024-01-31 DIAGNOSIS — N20.0 KIDNEY STONES: Primary | ICD-10-CM

## 2024-01-31 LAB
POC APPEARANCE, URINE: CLEAR
POC BILIRUBIN, URINE: NEGATIVE
POC BLOOD, URINE: NEGATIVE
POC COLOR, URINE: YELLOW
POC GLUCOSE, URINE: NEGATIVE MG/DL
POC KETONES, URINE: NEGATIVE MG/DL
POC LEUKOCYTES, URINE: NEGATIVE
POC NITRITE,URINE: NEGATIVE
POC PH, URINE: 6 PH
POC PROTEIN, URINE: NEGATIVE MG/DL
POC SPECIFIC GRAVITY, URINE: <=1.005
POC UROBILINOGEN, URINE: 0.2 EU/DL

## 2024-01-31 PROCEDURE — 99203 OFFICE O/P NEW LOW 30 MIN: CPT | Performed by: UROLOGY

## 2024-01-31 PROCEDURE — 81002 URINALYSIS NONAUTO W/O SCOPE: CPT | Performed by: UROLOGY

## 2024-01-31 PROCEDURE — 1036F TOBACCO NON-USER: CPT | Performed by: UROLOGY

## 2024-01-31 ASSESSMENT — PAIN SCALES - GENERAL: PAINLEVEL: 0-NO PAIN

## 2024-01-31 NOTE — PROGRESS NOTES
Subjective   Sarika Figueroa is a 53 y.o. female presenting today as a new patient due to renal stones. Patient presented to ER on 1/22/24 with complaints of abdominal pain, nausea, vomiting and diarrhea. She underwent a CT which showed a 5mm stone at the left UVJ. Patients symptoms have since resolved. She has no prior history of nephrolithiasis. Currently she is asymptomatic. Denies any recent gross hematuria, fevers, chills, urinary retention, intractable flank or abdominal pain, nausea or vomiting.      Past Medical History:   Diagnosis Date    Chest pain, unspecified 03/21/2018    Chest pain    Encounter for other screening for malignant neoplasm of breast 10/23/2020    Breast screening    Encounter for screening for malignant neoplasm of colon 01/12/2021    Colon cancer screening    Excessive and frequent menstruation with regular cycle     Menorrhagia with regular cycle    Hyperlipidemia, unspecified 10/28/2021    Dyslipidemia (high LDL; low HDL)    Palpitations 03/21/2018    Palpitations    Personal history of other infectious and parasitic diseases 06/14/2016    History of Helicobacter infection     Past Surgical History:   Procedure Laterality Date    BACK SURGERY  10/14/2016    Back Surgery    COLONOSCOPY  06/14/2016    Complete Colonoscopy    HYSTEROSCOPY  02/15/2018    Hysteroscopy With Endometrial Ablation    OTHER SURGICAL HISTORY  03/02/2022    Dilation and curettage    SHOULDER SURGERY  10/14/2016    Shoulder Surgery     Family History   Problem Relation Name Age of Onset    Migraines Mother      Colonic polyp Father      Other (abdominal aneurysm) Brother      Ulcerative colitis Paternal Grandfather      Colonic polyp Paternal Grandfather      Ulcerative colitis Other sibling     Breast cancer Mother's Sister       Current Outpatient Medications   Medication Sig Dispense Refill    albuterol (ProAir HFA) 90 mcg/actuation inhaler Inhale 1-2 puffs every 4 hours if needed for wheezing. 8.5 g 11     montelukast (Singulair) 10 mg tablet Take 1 tablet (10 mg) by mouth once daily as needed (prior to exposure). 30 tablet 5    tocilizumab (Actemra ACTPen) 162 mg/0.9 mL Inject 0.9 mL (162 mg) under the skin every 14 (fourteen) days. 6 each 1     No current facility-administered medications for this visit.     Allergies   Allergen Reactions    Humira [Adalimumab] Rash     Social History     Socioeconomic History    Marital status:      Spouse name: Not on file    Number of children: Not on file    Years of education: Not on file    Highest education level: Not on file   Occupational History    Not on file   Tobacco Use    Smoking status: Never     Passive exposure: Never    Smokeless tobacco: Never   Vaping Use    Vaping Use: Never used   Substance and Sexual Activity    Alcohol use: Yes     Alcohol/week: 2.0 standard drinks of alcohol     Types: 2 Glasses of wine per week    Drug use: Never    Sexual activity: Not on file   Other Topics Concern    Not on file   Social History Narrative    Not on file     Social Determinants of Health     Financial Resource Strain: Not on file   Food Insecurity: Not on file   Transportation Needs: Not on file   Physical Activity: Not on file   Stress: Not on file   Social Connections: Not on file   Intimate Partner Violence: Not on file   Housing Stability: Not on file       Review of Systems  Pertinent items are noted in HPI.    Objective     Lab Review  Lab Results   Component Value Date    WBC 9.1 01/22/2024    RBC 4.54 01/22/2024    HGB 14.3 01/22/2024    HCT 43.3 01/22/2024     01/22/2024      Lab Results   Component Value Date    BUN 16 01/22/2024    CREATININE 0.85 01/22/2024        Urine analysis shows negative     Assessment/Plan   Diagnoses and all orders for this visit:  Kidney stones  -     POCT UA (nonautomated) manually resulted  -     US renal complete; Future  -     Referral to Nephrology; Future  -     US renal complete; Future    5mm obstructing left  renal stone     I reviewed CT A&P from 1/22/24 which shows moderate left hydroureteronephrosis secondary to 0.5 cm obstructing left renal calculus. Additional nonobstructing left renal calculi measuring up to 2 mm.     Currently she is asymptomatic. Denies any recent gross hematuria, fevers, chills, urinary retention, intractable flank or abdominal pain, nausea or vomiting.    We will obtain a renal US to confirm resolution hydronephrosis and stone passage and follow up virtually to review.     We will refer patient to Dr. Barr for stone prevention.     2. 2mm nonobstructing left renal stone     We discussed that most calculi under 5 mm can be safely observed. This recommendation is based on large series looking at spontaneous passage rates that suggest that the likelihood of stone passage is highest for stones under 4 mm in size (approximately 70-80%), moderate for stones between 4 and 6 mm (50%), and lowest for stones 6 mm or greater (20%-30%).      However, stones under 5 mm that are in a solitary kidney, associated with infection or causing significant obstruction should be removed to prevent loss of renal function and/or sepsis. Also, every patient has different anatomy and different ability to pass calculi and tolerate pain, so intervention is also recommended in patients with small stones that are in significant discomfort or that have a history of being unable to pass small calculi. Patient understands and desires to proceed.    We will follow up annually with renal US to monitor stone presence and formation.     All questions were answered to the patient's satisfaction. Patient agrees with the plan and wishes to proceed. Follow-up will be scheduled appropriately.     Scribed for Dr. Nunez by Lizeth Bryson. I , Dr Nunez, have personally reviewed and agreed with the information entered by the Virtual Scribe.

## 2024-02-02 ENCOUNTER — CLINICAL SUPPORT (OUTPATIENT)
Dept: ALLERGY | Facility: CLINIC | Age: 54
End: 2024-02-02
Payer: COMMERCIAL

## 2024-02-02 ENCOUNTER — HOSPITAL ENCOUNTER (OUTPATIENT)
Dept: RADIOLOGY | Facility: HOSPITAL | Age: 54
Discharge: HOME | End: 2024-02-02
Payer: COMMERCIAL

## 2024-02-02 DIAGNOSIS — J30.2 SEASONAL ALLERGIES: ICD-10-CM

## 2024-02-02 DIAGNOSIS — N20.0 KIDNEY STONES: ICD-10-CM

## 2024-02-02 PROCEDURE — 76770 US EXAM ABDO BACK WALL COMP: CPT

## 2024-02-02 PROCEDURE — 95117 IMMUNOTHERAPY INJECTIONS: CPT | Performed by: ALLERGY & IMMUNOLOGY

## 2024-02-05 ENCOUNTER — CLINICAL SUPPORT (OUTPATIENT)
Dept: ALLERGY | Facility: CLINIC | Age: 54
End: 2024-02-05
Payer: COMMERCIAL

## 2024-02-05 DIAGNOSIS — J30.2 SEASONAL ALLERGIES: ICD-10-CM

## 2024-02-05 PROCEDURE — 95117 IMMUNOTHERAPY INJECTIONS: CPT | Performed by: ALLERGY & IMMUNOLOGY

## 2024-02-12 ENCOUNTER — SPECIALTY PHARMACY (OUTPATIENT)
Dept: PHARMACY | Facility: CLINIC | Age: 54
End: 2024-02-12

## 2024-02-13 ENCOUNTER — CLINICAL SUPPORT (OUTPATIENT)
Dept: ALLERGY | Facility: CLINIC | Age: 54
End: 2024-02-13
Payer: COMMERCIAL

## 2024-02-13 DIAGNOSIS — J30.2 SEASONAL ALLERGIES: ICD-10-CM

## 2024-02-13 PROCEDURE — 95117 IMMUNOTHERAPY INJECTIONS: CPT | Performed by: ALLERGY & IMMUNOLOGY

## 2024-02-14 ENCOUNTER — TELEMEDICINE (OUTPATIENT)
Dept: UROLOGY | Facility: CLINIC | Age: 54
End: 2024-02-14
Payer: COMMERCIAL

## 2024-02-14 DIAGNOSIS — N20.0 KIDNEY STONES: ICD-10-CM

## 2024-02-14 PROCEDURE — 99213 OFFICE O/P EST LOW 20 MIN: CPT | Performed by: UROLOGY

## 2024-02-14 PROCEDURE — 1036F TOBACCO NON-USER: CPT | Performed by: UROLOGY

## 2024-02-14 NOTE — PROGRESS NOTES
Subjective   This visit was completed via telemedicine. All issues as below were discussed and addressed but no physical exam was performed unless allowed by visual confirmation. If it was felt that the patient should be evaluated in clinic, then they were directed there. Patient verbally consented to visit.    Sarika Figueroa is a 53 y.o. female with history of nephrolithiasis. Patient had a 5mm obstructing left renal stone and 2mm non-obstructing left renal stone on CT from 1/22/24.  She presents today for a follow up visit to review renal US. Denies any recent gross hematuria, fevers, chills, urinary retention, intractable flank or abdominal pain, nausea or vomiting.    LAST VISIT:     Sarika Figueroa is a 53 y.o. female presenting today as a new patient due to renal stones. Patient presented to ER on 1/22/24 with complaints of abdominal pain, nausea, vomiting and diarrhea. She underwent a CT which showed a 5mm stone at the left UVJ. Patients symptoms have since resolved. She has no prior history of nephrolithiasis. Currently she is asymptomatic. Denies any recent gross hematuria, fevers, chills, urinary retention, intractable flank or abdominal pain, nausea or vomiting.         Past Medical History:   Diagnosis Date    Chest pain, unspecified 03/21/2018    Chest pain    Encounter for other screening for malignant neoplasm of breast 10/23/2020    Breast screening    Encounter for screening for malignant neoplasm of colon 01/12/2021    Colon cancer screening    Excessive and frequent menstruation with regular cycle     Menorrhagia with regular cycle    Hyperlipidemia, unspecified 10/28/2021    Dyslipidemia (high LDL; low HDL)    Palpitations 03/21/2018    Palpitations    Personal history of other infectious and parasitic diseases 06/14/2016    History of Helicobacter infection     Past Surgical History:   Procedure Laterality Date    BACK SURGERY  10/14/2016    Back Surgery    COLONOSCOPY  06/14/2016     Complete Colonoscopy    HYSTEROSCOPY  02/15/2018    Hysteroscopy With Endometrial Ablation    OTHER SURGICAL HISTORY  03/02/2022    Dilation and curettage    SHOULDER SURGERY  10/14/2016    Shoulder Surgery     Family History   Problem Relation Name Age of Onset    Migraines Mother      Colonic polyp Father      Other (abdominal aneurysm) Brother      Ulcerative colitis Paternal Grandfather      Colonic polyp Paternal Grandfather      Ulcerative colitis Other sibling     Breast cancer Mother's Sister       Current Outpatient Medications   Medication Sig Dispense Refill    albuterol (ProAir HFA) 90 mcg/actuation inhaler Inhale 1-2 puffs every 4 hours if needed for wheezing. 8.5 g 11    montelukast (Singulair) 10 mg tablet Take 1 tablet (10 mg) by mouth once daily as needed (prior to exposure). 30 tablet 5    tocilizumab (Actemra ACTPen) 162 mg/0.9 mL Inject 0.9 mL (162 mg) under the skin every 14 (fourteen) days. 6 each 1     No current facility-administered medications for this visit.     Allergies   Allergen Reactions    Humira [Adalimumab] Rash     Social History     Socioeconomic History    Marital status:      Spouse name: Not on file    Number of children: Not on file    Years of education: Not on file    Highest education level: Not on file   Occupational History    Not on file   Tobacco Use    Smoking status: Never     Passive exposure: Never    Smokeless tobacco: Never   Vaping Use    Vaping Use: Never used   Substance and Sexual Activity    Alcohol use: Yes     Alcohol/week: 2.0 standard drinks of alcohol     Types: 2 Glasses of wine per week    Drug use: Never    Sexual activity: Not on file   Other Topics Concern    Not on file   Social History Narrative    Not on file     Social Determinants of Health     Financial Resource Strain: Not on file   Food Insecurity: Not on file   Transportation Needs: Not on file   Physical Activity: Not on file   Stress: Not on file   Social Connections: Not on file    Intimate Partner Violence: Not on file   Housing Stability: Not on file       Review of Systems  Pertinent items are noted in HPI.    Objective     Lab Review  Lab Results   Component Value Date    WBC 9.1 01/22/2024    RBC 4.54 01/22/2024    HGB 14.3 01/22/2024    HCT 43.3 01/22/2024     01/22/2024      Lab Results   Component Value Date    BUN 16 01/22/2024    CREATININE 0.85 01/22/2024          Assessment/Plan   There are no diagnoses linked to this encounter.    Nephrolithiasis     I reviewed renal US from 2/2/2024 which showed no renal stones or hydronephrosis.     We will follow up annually with renal US to monitor stone presence and formation.     All questions were answered to the patient's satisfaction. Patient agrees with the plan and wishes to proceed. Follow-up will be scheduled appropriately.     Scribed for Dr. Nunez by Lizeth Bryson. I , Dr Nunez, have personally reviewed and agreed with the information entered by the Virtual Scribe.

## 2024-02-16 ENCOUNTER — PHARMACY VISIT (OUTPATIENT)
Dept: PHARMACY | Facility: CLINIC | Age: 54
End: 2024-02-16
Payer: COMMERCIAL

## 2024-02-16 PROCEDURE — RXMED WILLOW AMBULATORY MEDICATION CHARGE

## 2024-02-20 ENCOUNTER — CLINICAL SUPPORT (OUTPATIENT)
Dept: ALLERGY | Facility: CLINIC | Age: 54
End: 2024-02-20
Payer: COMMERCIAL

## 2024-02-20 DIAGNOSIS — J30.2 SEASONAL ALLERGIES: ICD-10-CM

## 2024-02-20 PROCEDURE — 95117 IMMUNOTHERAPY INJECTIONS: CPT | Performed by: ALLERGY & IMMUNOLOGY

## 2024-03-01 ENCOUNTER — CLINICAL SUPPORT (OUTPATIENT)
Dept: ALLERGY | Facility: CLINIC | Age: 54
End: 2024-03-01
Payer: COMMERCIAL

## 2024-03-01 DIAGNOSIS — J30.2 SEASONAL ALLERGIES: ICD-10-CM

## 2024-03-01 PROCEDURE — 95117 IMMUNOTHERAPY INJECTIONS: CPT | Performed by: ALLERGY & IMMUNOLOGY

## 2024-03-05 ENCOUNTER — CLINICAL SUPPORT (OUTPATIENT)
Dept: ALLERGY | Facility: CLINIC | Age: 54
End: 2024-03-05
Payer: COMMERCIAL

## 2024-03-05 DIAGNOSIS — J30.2 SEASONAL ALLERGIES: ICD-10-CM

## 2024-03-05 PROCEDURE — 95117 IMMUNOTHERAPY INJECTIONS: CPT | Performed by: ALLERGY & IMMUNOLOGY

## 2024-03-11 ENCOUNTER — LAB (OUTPATIENT)
Dept: LAB | Facility: LAB | Age: 54
End: 2024-03-11
Payer: COMMERCIAL

## 2024-03-13 ENCOUNTER — SPECIALTY PHARMACY (OUTPATIENT)
Dept: PHARMACY | Facility: CLINIC | Age: 54
End: 2024-03-13

## 2024-03-13 PROCEDURE — RXMED WILLOW AMBULATORY MEDICATION CHARGE

## 2024-03-15 ENCOUNTER — CLINICAL SUPPORT (OUTPATIENT)
Dept: ALLERGY | Facility: CLINIC | Age: 54
End: 2024-03-15
Payer: COMMERCIAL

## 2024-03-15 DIAGNOSIS — J30.2 SEASONAL ALLERGIES: ICD-10-CM

## 2024-03-15 PROCEDURE — 95117 IMMUNOTHERAPY INJECTIONS: CPT | Performed by: ALLERGY & IMMUNOLOGY

## 2024-03-19 ENCOUNTER — PHARMACY VISIT (OUTPATIENT)
Dept: PHARMACY | Facility: CLINIC | Age: 54
End: 2024-03-19
Payer: COMMERCIAL

## 2024-03-19 ENCOUNTER — SPECIALTY PHARMACY (OUTPATIENT)
Dept: PHARMACY | Facility: CLINIC | Age: 54
End: 2024-03-19

## 2024-03-19 NOTE — PROGRESS NOTES
[facility] Clinical Note    Sarika Figueroa is a 53 y.o. female, who is on the specialty pharmacy service of: [facility].  Sarika Figueroa is taking: Actemra.     Sarika was contacted on 3/19/2024.    Refer to the encounter summary report for documentation details about patient counseling and education.      Impression/Plan  Is patient high risk? (potential patients:  pregnancy, geriatric, pediatric) No   Is laboratory follow-up needed? No  Is a clinical intervention needed?  No  Next assessment date?  ~9/19/2024  Additional comments:    Medication Adherence  The importance of adherence was discussed with the patient and they were advised to take the medication as prescribed by their provider. Sarika was encouraged to call her physician's office if they have a question regarding a missed dose.        Patient advised to contact the pharmacy if there are any changes to her medication list, including prescriptions, OTC medications, herbal products, or supplements. Patient was advised of [facility]’s dispensing process, refill timeline, contact information ([phone]), and patient management follow up. Patient confirmed understanding of education conducted during assessment. All patient questions and concerns were addressed to the best of my ability. Patient was encouraged to contact the specialty pharmacy with any questions or concerns.    Confirmed follow-up outreaches are properly scheduled. Reviewed goals of therapy in the program targets.    Nola Burns, PharmD

## 2024-03-22 ENCOUNTER — CLINICAL SUPPORT (OUTPATIENT)
Dept: ALLERGY | Facility: CLINIC | Age: 54
End: 2024-03-22
Payer: COMMERCIAL

## 2024-03-22 DIAGNOSIS — J30.2 SEASONAL ALLERGIES: ICD-10-CM

## 2024-03-22 PROCEDURE — 95117 IMMUNOTHERAPY INJECTIONS: CPT | Performed by: ALLERGY & IMMUNOLOGY

## 2024-03-26 ENCOUNTER — CLINICAL SUPPORT (OUTPATIENT)
Dept: ALLERGY | Facility: CLINIC | Age: 54
End: 2024-03-26
Payer: COMMERCIAL

## 2024-03-26 DIAGNOSIS — J30.2 SEASONAL ALLERGIES: ICD-10-CM

## 2024-03-26 PROCEDURE — 95117 IMMUNOTHERAPY INJECTIONS: CPT | Performed by: ALLERGY & IMMUNOLOGY

## 2024-04-02 ENCOUNTER — APPOINTMENT (OUTPATIENT)
Dept: ALLERGY | Facility: CLINIC | Age: 54
End: 2024-04-02
Payer: COMMERCIAL

## 2024-04-09 PROCEDURE — RXMED WILLOW AMBULATORY MEDICATION CHARGE

## 2024-04-12 ENCOUNTER — LAB (OUTPATIENT)
Dept: LAB | Facility: LAB | Age: 54
End: 2024-04-12
Payer: COMMERCIAL

## 2024-04-12 ENCOUNTER — CLINICAL SUPPORT (OUTPATIENT)
Dept: ALLERGY | Facility: CLINIC | Age: 54
End: 2024-04-12
Payer: COMMERCIAL

## 2024-04-12 ENCOUNTER — PHARMACY VISIT (OUTPATIENT)
Dept: PHARMACY | Facility: CLINIC | Age: 54
End: 2024-04-12
Payer: COMMERCIAL

## 2024-04-12 DIAGNOSIS — J30.2 SEASONAL ALLERGIES: ICD-10-CM

## 2024-04-12 DIAGNOSIS — M06.9 RHEUMATOID ARTHRITIS, INVOLVING UNSPECIFIED SITE, UNSPECIFIED WHETHER RHEUMATOID FACTOR PRESENT (MULTI): ICD-10-CM

## 2024-04-12 LAB
ALBUMIN SERPL BCP-MCNC: 4.6 G/DL (ref 3.4–5)
ALP SERPL-CCNC: 53 U/L (ref 33–110)
ALT SERPL W P-5'-P-CCNC: 18 U/L (ref 7–45)
ANION GAP SERPL CALC-SCNC: 13 MMOL/L (ref 10–20)
AST SERPL W P-5'-P-CCNC: 23 U/L (ref 9–39)
BILIRUB SERPL-MCNC: 0.6 MG/DL (ref 0–1.2)
BUN SERPL-MCNC: 16 MG/DL (ref 6–23)
CALCIUM SERPL-MCNC: 9.6 MG/DL (ref 8.6–10.6)
CHLORIDE SERPL-SCNC: 102 MMOL/L (ref 98–107)
CO2 SERPL-SCNC: 30 MMOL/L (ref 21–32)
CREAT SERPL-MCNC: 0.71 MG/DL (ref 0.5–1.05)
CRP SERPL-MCNC: 0.13 MG/DL
EGFRCR SERPLBLD CKD-EPI 2021: >90 ML/MIN/1.73M*2
ERYTHROCYTE [DISTWIDTH] IN BLOOD BY AUTOMATED COUNT: 11.7 % (ref 11.5–14.5)
ERYTHROCYTE [SEDIMENTATION RATE] IN BLOOD BY WESTERGREN METHOD: <1 MM/H (ref 0–30)
GLUCOSE SERPL-MCNC: 76 MG/DL (ref 74–99)
HCT VFR BLD AUTO: 41.1 % (ref 36–46)
HGB BLD-MCNC: 13.6 G/DL (ref 12–16)
MCH RBC QN AUTO: 31.3 PG (ref 26–34)
MCHC RBC AUTO-ENTMCNC: 33.1 G/DL (ref 32–36)
MCV RBC AUTO: 95 FL (ref 80–100)
NRBC BLD-RTO: 0 /100 WBCS (ref 0–0)
PLATELET # BLD AUTO: 272 X10*3/UL (ref 150–450)
POTASSIUM SERPL-SCNC: 3.9 MMOL/L (ref 3.5–5.3)
PROT SERPL-MCNC: 7.1 G/DL (ref 6.4–8.2)
RBC # BLD AUTO: 4.34 X10*6/UL (ref 4–5.2)
SODIUM SERPL-SCNC: 141 MMOL/L (ref 136–145)
WBC # BLD AUTO: 3.5 X10*3/UL (ref 4.4–11.3)

## 2024-04-12 PROCEDURE — 95117 IMMUNOTHERAPY INJECTIONS: CPT | Performed by: ALLERGY & IMMUNOLOGY

## 2024-04-12 PROCEDURE — 80053 COMPREHEN METABOLIC PANEL: CPT

## 2024-04-12 PROCEDURE — 36415 COLL VENOUS BLD VENIPUNCTURE: CPT

## 2024-04-12 PROCEDURE — 85027 COMPLETE CBC AUTOMATED: CPT

## 2024-04-12 PROCEDURE — 86140 C-REACTIVE PROTEIN: CPT

## 2024-04-12 PROCEDURE — 85652 RBC SED RATE AUTOMATED: CPT

## 2024-04-18 ENCOUNTER — APPOINTMENT (OUTPATIENT)
Dept: RHEUMATOLOGY | Facility: CLINIC | Age: 54
End: 2024-04-18
Payer: COMMERCIAL

## 2024-04-19 ENCOUNTER — CLINICAL SUPPORT (OUTPATIENT)
Dept: ALLERGY | Facility: CLINIC | Age: 54
End: 2024-04-19
Payer: COMMERCIAL

## 2024-04-19 DIAGNOSIS — J30.2 SEASONAL ALLERGIES: ICD-10-CM

## 2024-04-19 PROCEDURE — 95117 IMMUNOTHERAPY INJECTIONS: CPT | Performed by: ALLERGY & IMMUNOLOGY

## 2024-04-26 ENCOUNTER — CLINICAL SUPPORT (OUTPATIENT)
Dept: ALLERGY | Facility: CLINIC | Age: 54
End: 2024-04-26
Payer: COMMERCIAL

## 2024-04-26 DIAGNOSIS — J30.2 SEASONAL ALLERGIES: ICD-10-CM

## 2024-04-26 PROCEDURE — 95117 IMMUNOTHERAPY INJECTIONS: CPT | Performed by: ALLERGY & IMMUNOLOGY

## 2024-04-29 ENCOUNTER — APPOINTMENT (OUTPATIENT)
Dept: RHEUMATOLOGY | Facility: CLINIC | Age: 54
End: 2024-04-29
Payer: COMMERCIAL

## 2024-05-03 ENCOUNTER — CLINICAL SUPPORT (OUTPATIENT)
Dept: ALLERGY | Facility: CLINIC | Age: 54
End: 2024-05-03
Payer: COMMERCIAL

## 2024-05-03 DIAGNOSIS — J30.2 SEASONAL ALLERGIES: ICD-10-CM

## 2024-05-03 PROCEDURE — 95117 IMMUNOTHERAPY INJECTIONS: CPT | Performed by: ALLERGY & IMMUNOLOGY

## 2024-05-07 ENCOUNTER — SPECIALTY PHARMACY (OUTPATIENT)
Dept: PHARMACY | Facility: CLINIC | Age: 54
End: 2024-05-07

## 2024-05-07 PROCEDURE — RXMED WILLOW AMBULATORY MEDICATION CHARGE

## 2024-05-09 ENCOUNTER — PHARMACY VISIT (OUTPATIENT)
Dept: PHARMACY | Facility: CLINIC | Age: 54
End: 2024-05-09
Payer: COMMERCIAL

## 2024-05-21 ENCOUNTER — SPECIALTY PHARMACY (OUTPATIENT)
Dept: PHARMACY | Facility: CLINIC | Age: 54
End: 2024-05-21

## 2024-05-21 DIAGNOSIS — M06.9 RHEUMATOID ARTHRITIS, INVOLVING UNSPECIFIED SITE, UNSPECIFIED WHETHER RHEUMATOID FACTOR PRESENT (MULTI): ICD-10-CM

## 2024-05-21 RX ORDER — TOCILIZUMAB 180 MG/ML
162 INJECTION, SOLUTION SUBCUTANEOUS
Qty: 6 EACH | Refills: 1 | Status: SHIPPED | OUTPATIENT
Start: 2024-05-21 | End: 2024-11-05

## 2024-05-24 ENCOUNTER — OFFICE VISIT (OUTPATIENT)
Dept: ALLERGY | Facility: CLINIC | Age: 54
End: 2024-05-24
Payer: COMMERCIAL

## 2024-05-24 VITALS
DIASTOLIC BLOOD PRESSURE: 66 MMHG | BODY MASS INDEX: 22.66 KG/M2 | WEIGHT: 136 LBS | HEIGHT: 65 IN | OXYGEN SATURATION: 96 % | SYSTOLIC BLOOD PRESSURE: 108 MMHG | HEART RATE: 61 BPM

## 2024-05-24 DIAGNOSIS — J30.81 ALLERGIC RHINITIS DUE TO ANIMAL HAIR AND DANDER: ICD-10-CM

## 2024-05-24 DIAGNOSIS — T78.2XXA ANAPHYLAXIS, INITIAL ENCOUNTER: Primary | ICD-10-CM

## 2024-05-24 DIAGNOSIS — J30.9 ALLERGIC RHINITIS, UNSPECIFIED SEASONALITY, UNSPECIFIED TRIGGER: ICD-10-CM

## 2024-05-24 DIAGNOSIS — H10.45 CHRONIC ALLERGIC CONJUNCTIVITIS: ICD-10-CM

## 2024-05-24 PROCEDURE — 95117 IMMUNOTHERAPY INJECTIONS: CPT | Performed by: ALLERGY & IMMUNOLOGY

## 2024-05-24 PROCEDURE — 99215 OFFICE O/P EST HI 40 MIN: CPT | Performed by: ALLERGY & IMMUNOLOGY

## 2024-05-24 PROCEDURE — 1036F TOBACCO NON-USER: CPT | Performed by: ALLERGY & IMMUNOLOGY

## 2024-05-24 RX ORDER — EPINEPHRINE 0.3 MG/.3ML
0.3 INJECTION SUBCUTANEOUS AS NEEDED
Qty: 2 EACH | Refills: 3 | Status: SHIPPED | OUTPATIENT
Start: 2024-05-24

## 2024-05-24 NOTE — PROGRESS NOTES
"Subjective   Patient ID:   [id number]   Sarika Figueroa is a 54 y.o. female who presents for Allergies and Allergic Rhinitis.    Chief Complaint   Patient presents with    Allergies    Allergic Rhinitis          HPI  This patient is here to evaluate for:      Allergic rhinitis and conjunctivitis -   Doing well on the allergy shtos.   Did have a reaction vs/ anxiety in dec /23 but none since    She now can be around dogs.   She is cautious with petting but can now be in their house all day.  Prior to shots, needed benadryl, zyrtec and had chest tightness and had to leave.     Takes Nasacort  Prn zyrtec  Hasn't needed albuterol in months but she keeps it on hand.     Last shot 5/3/24        Review of Systems   All other systems reviewed and are negative.        Objective     /66 (BP Location: Right arm, Patient Position: Sitting)   Pulse 61   Ht 1.651 m (5' 5\")   Wt 61.7 kg (136 lb)   SpO2 96%   BMI 22.63 kg/m²      Physical Exam  Constitutional:       General: She is not in acute distress.     Appearance: Normal appearance. She is not ill-appearing.   HENT:      Head: Normocephalic and atraumatic.      Right Ear: Tympanic membrane, ear canal and external ear normal.      Left Ear: Tympanic membrane, ear canal and external ear normal.      Nose: Nose normal. No congestion or rhinorrhea.      Mouth/Throat:      Mouth: Mucous membranes are moist.      Pharynx: Oropharynx is clear. No oropharyngeal exudate or posterior oropharyngeal erythema.   Eyes:      General:         Right eye: No discharge.         Left eye: No discharge.      Conjunctiva/sclera: Conjunctivae normal.   Cardiovascular:      Rate and Rhythm: Normal rate and regular rhythm.      Heart sounds: Normal heart sounds. No murmur heard.     No friction rub. No gallop.   Pulmonary:      Effort: Pulmonary effort is normal. No respiratory distress.      Breath sounds: Normal breath sounds. No stridor. No wheezing, rhonchi or rales.   Chest:      " Chest wall: No tenderness.   Abdominal:      General: Abdomen is flat.      Palpations: Abdomen is soft.   Musculoskeletal:         General: Normal range of motion.      Cervical back: Normal range of motion and neck supple.   Lymphadenopathy:      Cervical: No cervical adenopathy.   Skin:     General: Skin is warm and dry.      Findings: No erythema, lesion or rash.   Neurological:      General: No focal deficit present.      Mental Status: She is alert. Mental status is at baseline.   Psychiatric:         Mood and Affect: Mood normal.         Behavior: Behavior normal.         Thought Content: Thought content normal.         Judgment: Judgment normal.            Current Outpatient Medications   Medication Sig Dispense Refill    albuterol (ProAir HFA) 90 mcg/actuation inhaler Inhale 1-2 puffs every 4 hours if needed for wheezing. 8.5 g 11    montelukast (Singulair) 10 mg tablet Take 1 tablet (10 mg) by mouth once daily as needed (prior to exposure). 30 tablet 5    tocilizumab (Actemra ACTPen) 162 mg/0.9 mL Inject 0.9 mL (162 mg) under the skin every 14 (fourteen) days. 6 each 1     No current facility-administered medications for this visit.       Summary of the labs over the past 6 months:    Lab on 04/12/2024   Component Date Value Ref Range Status    WBC 04/12/2024 3.5 (L)  4.4 - 11.3 x10*3/uL Final    nRBC 04/12/2024 0.0  0.0 - 0.0 /100 WBCs Final    RBC 04/12/2024 4.34  4.00 - 5.20 x10*6/uL Final    Hemoglobin 04/12/2024 13.6  12.0 - 16.0 g/dL Final    Hematocrit 04/12/2024 41.1  36.0 - 46.0 % Final    MCV 04/12/2024 95  80 - 100 fL Final    MCH 04/12/2024 31.3  26.0 - 34.0 pg Final    MCHC 04/12/2024 33.1  32.0 - 36.0 g/dL Final    RDW 04/12/2024 11.7  11.5 - 14.5 % Final    Platelets 04/12/2024 272  150 - 450 x10*3/uL Final    Glucose 04/12/2024 76  74 - 99 mg/dL Final    Sodium 04/12/2024 141  136 - 145 mmol/L Final    Potassium 04/12/2024 3.9  3.5 - 5.3 mmol/L Final    Chloride 04/12/2024 102  98 - 107  mmol/L Final    Bicarbonate 04/12/2024 30  21 - 32 mmol/L Final    Anion Gap 04/12/2024 13  10 - 20 mmol/L Final    Urea Nitrogen 04/12/2024 16  6 - 23 mg/dL Final    Creatinine 04/12/2024 0.71  0.50 - 1.05 mg/dL Final    eGFR 04/12/2024 >90  >60 mL/min/1.73m*2 Final    Calcium 04/12/2024 9.6  8.6 - 10.6 mg/dL Final    Albumin 04/12/2024 4.6  3.4 - 5.0 g/dL Final    Alkaline Phosphatase 04/12/2024 53  33 - 110 U/L Final    Total Protein 04/12/2024 7.1  6.4 - 8.2 g/dL Final    AST 04/12/2024 23  9 - 39 U/L Final    Bilirubin, Total 04/12/2024 0.6  0.0 - 1.2 mg/dL Final    ALT 04/12/2024 18  7 - 45 U/L Final    Sedimentation Rate 04/12/2024 <1  0 - 30 mm/h Final    C-Reactive Protein 04/12/2024 0.13  <1.00 mg/dL Final   Office Visit on 01/31/2024   Component Date Value Ref Range Status    POC Color, Urine 01/31/2024 Yellow  Straw, Yellow, Light-Yellow Final    POC Appearance, Urine 01/31/2024 Clear  Clear Final    POC Glucose, Urine 01/31/2024 NEGATIVE  NEGATIVE mg/dl Final    POC Bilirubin, Urine 01/31/2024 NEGATIVE  NEGATIVE Final    POC Ketones, Urine 01/31/2024 NEGATIVE  NEGATIVE mg/dl Final    POC Specific Gravity, Urine 01/31/2024 <=1.005  1.005 - 1.035 Final    POC Blood, Urine 01/31/2024 NEGATIVE  NEGATIVE Final    POC PH, Urine 01/31/2024 6.0  No Reference Range Established PH Final    POC Protein, Urine 01/31/2024 NEGATIVE  NEGATIVE, 30 (1+) mg/dl Final    POC Urobilinogen, Urine 01/31/2024 0.2  0.2, 1.0 EU/DL Final    Poc Nitrite, Urine 01/31/2024 NEGATIVE  NEGATIVE Final    POC Leukocytes, Urine 01/31/2024 NEGATIVE  NEGATIVE Final   Admission on 01/22/2024, Discharged on 01/23/2024   Component Date Value Ref Range Status    Color, Urine 01/22/2024 Yellow  Straw, Yellow Final    Appearance, Urine 01/22/2024 Clear  Clear Final    Specific Gravity, Urine 01/22/2024 1.010  1.005 - 1.035 Final    pH, Urine 01/22/2024 7.0  5.0, 5.5, 6.0, 6.5, 7.0, 7.5, 8.0 Final    Protein, Urine 01/22/2024 NEGATIVE  NEGATIVE  mg/dL Final    Glucose, Urine 01/22/2024 NEGATIVE  NEGATIVE mg/dL Final    Blood, Urine 01/22/2024 SMALL (1+) (A)  NEGATIVE Final    Ketones, Urine 01/22/2024 NEGATIVE  NEGATIVE mg/dL Final    Bilirubin, Urine 01/22/2024 NEGATIVE  NEGATIVE Final    Urobilinogen, Urine 01/22/2024 <2.0  <2.0 mg/dL Final    Nitrite, Urine 01/22/2024 NEGATIVE  NEGATIVE Final    Leukocyte Esterase, Urine 01/22/2024 TRACE (A)  NEGATIVE Final    WBC 01/22/2024 9.1  4.4 - 11.3 x10*3/uL Final    nRBC 01/22/2024 0.0  0.0 - 0.0 /100 WBCs Final    RBC 01/22/2024 4.54  4.00 - 5.20 x10*6/uL Final    Hemoglobin 01/22/2024 14.3  12.0 - 16.0 g/dL Final    Hematocrit 01/22/2024 43.3  36.0 - 46.0 % Final    MCV 01/22/2024 95  80 - 100 fL Final    MCH 01/22/2024 31.5  26.0 - 34.0 pg Final    MCHC 01/22/2024 33.0  32.0 - 36.0 g/dL Final    RDW 01/22/2024 11.7  11.5 - 14.5 % Final    Platelets 01/22/2024 264  150 - 450 x10*3/uL Final    Neutrophils % 01/22/2024 85.5  40.0 - 80.0 % Final    Immature Granulocytes %, Automated 01/22/2024 1.3 (H)  0.0 - 0.9 % Final    Lymphocytes % 01/22/2024 8.7  13.0 - 44.0 % Final    Monocytes % 01/22/2024 3.3  2.0 - 10.0 % Final    Eosinophils % 01/22/2024 0.8  0.0 - 6.0 % Final    Basophils % 01/22/2024 0.4  0.0 - 2.0 % Final    Neutrophils Absolute 01/22/2024 7.77 (H)  1.20 - 7.70 x10*3/uL Final    Immature Granulocytes Absolute, Au* 01/22/2024 0.12  0.00 - 0.70 x10*3/uL Final    Lymphocytes Absolute 01/22/2024 0.79 (L)  1.20 - 4.80 x10*3/uL Final    Monocytes Absolute 01/22/2024 0.30  0.10 - 1.00 x10*3/uL Final    Eosinophils Absolute 01/22/2024 0.07  0.00 - 0.70 x10*3/uL Final    Basophils Absolute 01/22/2024 0.04  0.00 - 0.10 x10*3/uL Final    Glucose 01/22/2024 91  74 - 99 mg/dL Final    Sodium 01/22/2024 139  136 - 145 mmol/L Final    Potassium 01/22/2024 4.2  3.5 - 5.3 mmol/L Final    Chloride 01/22/2024 100  98 - 107 mmol/L Final    Bicarbonate 01/22/2024 32  21 - 32 mmol/L Final    Anion Gap 01/22/2024 11  10 -  20 mmol/L Final    Urea Nitrogen 01/22/2024 16  6 - 23 mg/dL Final    Creatinine 01/22/2024 0.85  0.50 - 1.05 mg/dL Final    eGFR 01/22/2024 82  >60 mL/min/1.73m*2 Final    Calcium 01/22/2024 9.5  8.6 - 10.3 mg/dL Final    Albumin 01/22/2024 4.5  3.4 - 5.0 g/dL Final    Alkaline Phosphatase 01/22/2024 53  33 - 110 U/L Final    Total Protein 01/22/2024 7.3  6.4 - 8.2 g/dL Final    AST 01/22/2024 21  9 - 39 U/L Final    Bilirubin, Total 01/22/2024 0.7  0.0 - 1.2 mg/dL Final    ALT 01/22/2024 17  7 - 45 U/L Final    Lipase 01/22/2024 25  9 - 82 U/L Final    Lactate 01/22/2024 1.1  0.4 - 2.0 mmol/L Final    WBC, Urine 01/22/2024 1-5  1-5, NONE /HPF Final    RBC, Urine 01/22/2024 1-2  NONE, 1-2, 3-5 /HPF Final    Mucus, Urine 01/22/2024 3+  Reference range not established. /LPF Final   Lab on 01/11/2024   Component Date Value Ref Range Status    WBC 01/11/2024 4.5  4.4 - 11.3 x10*3/uL Final    nRBC 01/11/2024 0.0  0.0 - 0.0 /100 WBCs Final    RBC 01/11/2024 4.16  4.00 - 5.20 x10*6/uL Final    Hemoglobin 01/11/2024 13.6  12.0 - 16.0 g/dL Final    Hematocrit 01/11/2024 40.0  36.0 - 46.0 % Final    MCV 01/11/2024 96  80 - 100 fL Final    MCH 01/11/2024 32.7  26.0 - 34.0 pg Final    MCHC 01/11/2024 34.0  32.0 - 36.0 g/dL Final    RDW 01/11/2024 11.7  11.5 - 14.5 % Final    Platelets 01/11/2024 244  150 - 450 x10*3/uL Final    Glucose 01/11/2024 89  74 - 99 mg/dL Final    Sodium 01/11/2024 139  136 - 145 mmol/L Final    Potassium 01/11/2024 3.8  3.5 - 5.3 mmol/L Final    Chloride 01/11/2024 102  98 - 107 mmol/L Final    Bicarbonate 01/11/2024 28  21 - 32 mmol/L Final    Anion Gap 01/11/2024 13  10 - 20 mmol/L Final    Urea Nitrogen 01/11/2024 15  6 - 23 mg/dL Final    Creatinine 01/11/2024 0.68  0.50 - 1.05 mg/dL Final    eGFR 01/11/2024 >90  >60 mL/min/1.73m*2 Final    Calcium 01/11/2024 9.5  8.6 - 10.6 mg/dL Final    Albumin 01/11/2024 4.6  3.4 - 5.0 g/dL Final    Alkaline Phosphatase 01/11/2024 48  33 - 110 U/L Final     Total Protein 01/11/2024 6.6  6.4 - 8.2 g/dL Final    AST 01/11/2024 20  9 - 39 U/L Final    Bilirubin, Total 01/11/2024 0.8  0.0 - 1.2 mg/dL Final    ALT 01/11/2024 17  7 - 45 U/L Final    Sedimentation Rate 01/11/2024 <1  0 - 30 mm/h Final    C-Reactive Protein 01/11/2024 <0.10  <1.00 mg/dL Final    Color, Urine 01/11/2024 Straw  Straw, Yellow Final    Appearance, Urine 01/11/2024 Clear  Clear Final    Specific Gravity, Urine 01/11/2024 1.004 (N)  1.005 - 1.035 Final    pH, Urine 01/11/2024 6.0  5.0, 5.5, 6.0, 6.5, 7.0, 7.5, 8.0 Final    Protein, Urine 01/11/2024 NEGATIVE  NEGATIVE mg/dL Final    Glucose, Urine 01/11/2024 NEGATIVE  NEGATIVE mg/dL Final    Blood, Urine 01/11/2024 SMALL (1+) (A)  NEGATIVE Final    Ketones, Urine 01/11/2024 NEGATIVE  NEGATIVE mg/dL Final    Bilirubin, Urine 01/11/2024 NEGATIVE  NEGATIVE Final    Urobilinogen, Urine 01/11/2024 <2.0  <2.0 mg/dL Final    Nitrite, Urine 01/11/2024 NEGATIVE  NEGATIVE Final    Leukocyte Esterase, Urine 01/11/2024 NEGATIVE  NEGATIVE Final    WBC, Urine 01/11/2024 1-5  1-5, NONE /HPF Final    RBC, Urine 01/11/2024 NONE  NONE, 1-2, 3-5 /HPF Final    Bacteria, Urine 01/11/2024 1+ (A)  NONE SEEN /HPF Final         Assessment/Plan   Diagnoses and all orders for this visit:  Anaphylaxis, initial encounter  -     EPINEPHrine (Epipen) 0.3 mg/0.3 mL injection syringe; Inject 0.3 mL (0.3 mg) into the muscle if needed for anaphylaxis. Inject into UPPER, OUTER THIGH. Call 911 after use.  Allergic rhinitis, unspecified seasonality, unspecified trigger  Allergic rhinitis due to animal hair and dander  Chronic allergic conjunctivitis        Will accept the vials and transfer her care to my office.     I also recommend that this patient have an epinephrine auto-injector in case of the rare event of a serious allergic reaction. The patient was previously consented, questions were answered, and they demonstrate understanding. In order to do this in a safe manner, the patient  will initially receive weekly doses in the office. We will then space them out over time to approximately monthly.     She would like my office to remake vials when they . Will review what she is receiving and discuss at a follow-up visit.       Dima Mcmillan MD     There was a High Complexity of Medical Decision Making based on this being a patient new to my practice, the number of diagnoses and additional work up planned, and review of prior tests in the laboratory and also in the medicine section for Allergy, and review of old records.

## 2024-05-31 ENCOUNTER — APPOINTMENT (OUTPATIENT)
Dept: ALLERGY | Facility: CLINIC | Age: 54
End: 2024-05-31
Payer: COMMERCIAL

## 2024-05-31 PROCEDURE — RXMED WILLOW AMBULATORY MEDICATION CHARGE

## 2024-06-01 ENCOUNTER — PHARMACY VISIT (OUTPATIENT)
Dept: PHARMACY | Facility: CLINIC | Age: 54
End: 2024-06-01
Payer: COMMERCIAL

## 2024-06-03 ENCOUNTER — APPOINTMENT (OUTPATIENT)
Dept: ALLERGY | Facility: CLINIC | Age: 54
End: 2024-06-03
Payer: COMMERCIAL

## 2024-06-03 DIAGNOSIS — J30.89 ALLERGIC RHINITIS DUE TO OTHER ALLERGIC TRIGGER, UNSPECIFIED SEASONALITY: Primary | ICD-10-CM

## 2024-06-03 PROCEDURE — 95117 IMMUNOTHERAPY INJECTIONS: CPT | Performed by: ALLERGY & IMMUNOLOGY

## 2024-06-17 ENCOUNTER — APPOINTMENT (OUTPATIENT)
Dept: ALLERGY | Facility: CLINIC | Age: 54
End: 2024-06-17
Payer: COMMERCIAL

## 2024-06-17 DIAGNOSIS — J30.1 ALLERGIC RHINITIS DUE TO POLLEN, UNSPECIFIED SEASONALITY: ICD-10-CM

## 2024-06-17 DIAGNOSIS — J30.89 ALLERGIC RHINITIS DUE TO DUST: Primary | ICD-10-CM

## 2024-06-17 PROCEDURE — 95117 IMMUNOTHERAPY INJECTIONS: CPT | Performed by: ALLERGY & IMMUNOLOGY

## 2024-06-24 ENCOUNTER — APPOINTMENT (OUTPATIENT)
Dept: ALLERGY | Facility: CLINIC | Age: 54
End: 2024-06-24
Payer: COMMERCIAL

## 2024-06-24 ENCOUNTER — APPOINTMENT (OUTPATIENT)
Dept: OBSTETRICS AND GYNECOLOGY | Facility: CLINIC | Age: 54
End: 2024-06-24
Payer: COMMERCIAL

## 2024-06-24 DIAGNOSIS — J30.89 ALLERGIC RHINITIS DUE TO OTHER ALLERGIC TRIGGER, UNSPECIFIED SEASONALITY: Primary | ICD-10-CM

## 2024-06-24 PROCEDURE — 95117 IMMUNOTHERAPY INJECTIONS: CPT | Performed by: ALLERGY & IMMUNOLOGY

## 2024-06-29 NOTE — PROGRESS NOTES
See Allergy Shot Immunotherapy Record Book  Allergy checklist done, no concerns                  Subjective   Patient ID:   [id number]   Sarika Figueroa is a 54 y.o. female who presents for No chief complaint on file..    No chief complaint on file.         HPI  This patient is here to evaluate for:      Review of Systems      Objective     There were no vitals taken for this visit.     Physical Exam       Current Outpatient Medications   Medication Sig Dispense Refill    albuterol (ProAir HFA) 90 mcg/actuation inhaler Inhale 1-2 puffs every 4 hours if needed for wheezing. 8.5 g 11    EPINEPHrine (Epipen) 0.3 mg/0.3 mL injection syringe Inject 0.3 mL (0.3 mg) into the muscle if needed for anaphylaxis. Inject into UPPER, OUTER THIGH. Call 911 after use. 2 each 3    montelukast (Singulair) 10 mg tablet Take 1 tablet (10 mg) by mouth once daily as needed (prior to exposure). 30 tablet 5    tocilizumab (Actemra ACTPen) 162 mg/0.9 mL Inject 162 mg (1 pen) under the skin every 14 (fourteen) days. 6 each 1     No current facility-administered medications for this visit.       Summary of the labs over the past 6 months:    Lab on 04/12/2024   Component Date Value Ref Range Status    WBC 04/12/2024 3.5 (L)  4.4 - 11.3 x10*3/uL Final    nRBC 04/12/2024 0.0  0.0 - 0.0 /100 WBCs Final    RBC 04/12/2024 4.34  4.00 - 5.20 x10*6/uL Final    Hemoglobin 04/12/2024 13.6  12.0 - 16.0 g/dL Final    Hematocrit 04/12/2024 41.1  36.0 - 46.0 % Final    MCV 04/12/2024 95  80 - 100 fL Final    MCH 04/12/2024 31.3  26.0 - 34.0 pg Final    MCHC 04/12/2024 33.1  32.0 - 36.0 g/dL Final    RDW 04/12/2024 11.7  11.5 - 14.5 % Final    Platelets 04/12/2024 272  150 - 450 x10*3/uL Final    Glucose 04/12/2024 76  74 - 99 mg/dL Final    Sodium 04/12/2024 141  136 - 145 mmol/L Final    Potassium 04/12/2024 3.9  3.5 - 5.3 mmol/L Final    Chloride 04/12/2024 102  98 - 107 mmol/L Final    Bicarbonate 04/12/2024 30  21 - 32 mmol/L Final    Anion Gap  04/12/2024 13  10 - 20 mmol/L Final    Urea Nitrogen 04/12/2024 16  6 - 23 mg/dL Final    Creatinine 04/12/2024 0.71  0.50 - 1.05 mg/dL Final    eGFR 04/12/2024 >90  >60 mL/min/1.73m*2 Final    Calcium 04/12/2024 9.6  8.6 - 10.6 mg/dL Final    Albumin 04/12/2024 4.6  3.4 - 5.0 g/dL Final    Alkaline Phosphatase 04/12/2024 53  33 - 110 U/L Final    Total Protein 04/12/2024 7.1  6.4 - 8.2 g/dL Final    AST 04/12/2024 23  9 - 39 U/L Final    Bilirubin, Total 04/12/2024 0.6  0.0 - 1.2 mg/dL Final    ALT 04/12/2024 18  7 - 45 U/L Final    Sedimentation Rate 04/12/2024 <1  0 - 30 mm/h Final    C-Reactive Protein 04/12/2024 0.13  <1.00 mg/dL Final   Office Visit on 01/31/2024   Component Date Value Ref Range Status    POC Color, Urine 01/31/2024 Yellow  Straw, Yellow, Light-Yellow Final    POC Appearance, Urine 01/31/2024 Clear  Clear Final    POC Glucose, Urine 01/31/2024 NEGATIVE  NEGATIVE mg/dl Final    POC Bilirubin, Urine 01/31/2024 NEGATIVE  NEGATIVE Final    POC Ketones, Urine 01/31/2024 NEGATIVE  NEGATIVE mg/dl Final    POC Specific Gravity, Urine 01/31/2024 <=1.005  1.005 - 1.035 Final    POC Blood, Urine 01/31/2024 NEGATIVE  NEGATIVE Final    POC PH, Urine 01/31/2024 6.0  No Reference Range Established PH Final    POC Protein, Urine 01/31/2024 NEGATIVE  NEGATIVE, 30 (1+) mg/dl Final    POC Urobilinogen, Urine 01/31/2024 0.2  0.2, 1.0 EU/DL Final    Poc Nitrite, Urine 01/31/2024 NEGATIVE  NEGATIVE Final    POC Leukocytes, Urine 01/31/2024 NEGATIVE  NEGATIVE Final   Admission on 01/22/2024, Discharged on 01/23/2024   Component Date Value Ref Range Status    Color, Urine 01/22/2024 Yellow  Straw, Yellow Final    Appearance, Urine 01/22/2024 Clear  Clear Final    Specific Gravity, Urine 01/22/2024 1.010  1.005 - 1.035 Final    pH, Urine 01/22/2024 7.0  5.0, 5.5, 6.0, 6.5, 7.0, 7.5, 8.0 Final    Protein, Urine 01/22/2024 NEGATIVE  NEGATIVE mg/dL Final    Glucose, Urine 01/22/2024 NEGATIVE  NEGATIVE mg/dL Final     Blood, Urine 01/22/2024 SMALL (1+) (A)  NEGATIVE Final    Ketones, Urine 01/22/2024 NEGATIVE  NEGATIVE mg/dL Final    Bilirubin, Urine 01/22/2024 NEGATIVE  NEGATIVE Final    Urobilinogen, Urine 01/22/2024 <2.0  <2.0 mg/dL Final    Nitrite, Urine 01/22/2024 NEGATIVE  NEGATIVE Final    Leukocyte Esterase, Urine 01/22/2024 TRACE (A)  NEGATIVE Final    WBC 01/22/2024 9.1  4.4 - 11.3 x10*3/uL Final    nRBC 01/22/2024 0.0  0.0 - 0.0 /100 WBCs Final    RBC 01/22/2024 4.54  4.00 - 5.20 x10*6/uL Final    Hemoglobin 01/22/2024 14.3  12.0 - 16.0 g/dL Final    Hematocrit 01/22/2024 43.3  36.0 - 46.0 % Final    MCV 01/22/2024 95  80 - 100 fL Final    MCH 01/22/2024 31.5  26.0 - 34.0 pg Final    MCHC 01/22/2024 33.0  32.0 - 36.0 g/dL Final    RDW 01/22/2024 11.7  11.5 - 14.5 % Final    Platelets 01/22/2024 264  150 - 450 x10*3/uL Final    Neutrophils % 01/22/2024 85.5  40.0 - 80.0 % Final    Immature Granulocytes %, Automated 01/22/2024 1.3 (H)  0.0 - 0.9 % Final    Lymphocytes % 01/22/2024 8.7  13.0 - 44.0 % Final    Monocytes % 01/22/2024 3.3  2.0 - 10.0 % Final    Eosinophils % 01/22/2024 0.8  0.0 - 6.0 % Final    Basophils % 01/22/2024 0.4  0.0 - 2.0 % Final    Neutrophils Absolute 01/22/2024 7.77 (H)  1.20 - 7.70 x10*3/uL Final    Immature Granulocytes Absolute, Au* 01/22/2024 0.12  0.00 - 0.70 x10*3/uL Final    Lymphocytes Absolute 01/22/2024 0.79 (L)  1.20 - 4.80 x10*3/uL Final    Monocytes Absolute 01/22/2024 0.30  0.10 - 1.00 x10*3/uL Final    Eosinophils Absolute 01/22/2024 0.07  0.00 - 0.70 x10*3/uL Final    Basophils Absolute 01/22/2024 0.04  0.00 - 0.10 x10*3/uL Final    Glucose 01/22/2024 91  74 - 99 mg/dL Final    Sodium 01/22/2024 139  136 - 145 mmol/L Final    Potassium 01/22/2024 4.2  3.5 - 5.3 mmol/L Final    Chloride 01/22/2024 100  98 - 107 mmol/L Final    Bicarbonate 01/22/2024 32  21 - 32 mmol/L Final    Anion Gap 01/22/2024 11  10 - 20 mmol/L Final    Urea Nitrogen 01/22/2024 16  6 - 23 mg/dL Final     Creatinine 01/22/2024 0.85  0.50 - 1.05 mg/dL Final    eGFR 01/22/2024 82  >60 mL/min/1.73m*2 Final    Calcium 01/22/2024 9.5  8.6 - 10.3 mg/dL Final    Albumin 01/22/2024 4.5  3.4 - 5.0 g/dL Final    Alkaline Phosphatase 01/22/2024 53  33 - 110 U/L Final    Total Protein 01/22/2024 7.3  6.4 - 8.2 g/dL Final    AST 01/22/2024 21  9 - 39 U/L Final    Bilirubin, Total 01/22/2024 0.7  0.0 - 1.2 mg/dL Final    ALT 01/22/2024 17  7 - 45 U/L Final    Lipase 01/22/2024 25  9 - 82 U/L Final    Lactate 01/22/2024 1.1  0.4 - 2.0 mmol/L Final    WBC, Urine 01/22/2024 1-5  1-5, NONE /HPF Final    RBC, Urine 01/22/2024 1-2  NONE, 1-2, 3-5 /HPF Final    Mucus, Urine 01/22/2024 3+  Reference range not established. /LPF Final   Lab on 01/11/2024   Component Date Value Ref Range Status    WBC 01/11/2024 4.5  4.4 - 11.3 x10*3/uL Final    nRBC 01/11/2024 0.0  0.0 - 0.0 /100 WBCs Final    RBC 01/11/2024 4.16  4.00 - 5.20 x10*6/uL Final    Hemoglobin 01/11/2024 13.6  12.0 - 16.0 g/dL Final    Hematocrit 01/11/2024 40.0  36.0 - 46.0 % Final    MCV 01/11/2024 96  80 - 100 fL Final    MCH 01/11/2024 32.7  26.0 - 34.0 pg Final    MCHC 01/11/2024 34.0  32.0 - 36.0 g/dL Final    RDW 01/11/2024 11.7  11.5 - 14.5 % Final    Platelets 01/11/2024 244  150 - 450 x10*3/uL Final    Glucose 01/11/2024 89  74 - 99 mg/dL Final    Sodium 01/11/2024 139  136 - 145 mmol/L Final    Potassium 01/11/2024 3.8  3.5 - 5.3 mmol/L Final    Chloride 01/11/2024 102  98 - 107 mmol/L Final    Bicarbonate 01/11/2024 28  21 - 32 mmol/L Final    Anion Gap 01/11/2024 13  10 - 20 mmol/L Final    Urea Nitrogen 01/11/2024 15  6 - 23 mg/dL Final    Creatinine 01/11/2024 0.68  0.50 - 1.05 mg/dL Final    eGFR 01/11/2024 >90  >60 mL/min/1.73m*2 Final    Calcium 01/11/2024 9.5  8.6 - 10.6 mg/dL Final    Albumin 01/11/2024 4.6  3.4 - 5.0 g/dL Final    Alkaline Phosphatase 01/11/2024 48  33 - 110 U/L Final    Total Protein 01/11/2024 6.6  6.4 - 8.2 g/dL Final    AST 01/11/2024 20   9 - 39 U/L Final    Bilirubin, Total 01/11/2024 0.8  0.0 - 1.2 mg/dL Final    ALT 01/11/2024 17  7 - 45 U/L Final    Sedimentation Rate 01/11/2024 <1  0 - 30 mm/h Final    C-Reactive Protein 01/11/2024 <0.10  <1.00 mg/dL Final    Color, Urine 01/11/2024 Straw  Straw, Yellow Final    Appearance, Urine 01/11/2024 Clear  Clear Final    Specific Gravity, Urine 01/11/2024 1.004 (N)  1.005 - 1.035 Final    pH, Urine 01/11/2024 6.0  5.0, 5.5, 6.0, 6.5, 7.0, 7.5, 8.0 Final    Protein, Urine 01/11/2024 NEGATIVE  NEGATIVE mg/dL Final    Glucose, Urine 01/11/2024 NEGATIVE  NEGATIVE mg/dL Final    Blood, Urine 01/11/2024 SMALL (1+) (A)  NEGATIVE Final    Ketones, Urine 01/11/2024 NEGATIVE  NEGATIVE mg/dL Final    Bilirubin, Urine 01/11/2024 NEGATIVE  NEGATIVE Final    Urobilinogen, Urine 01/11/2024 <2.0  <2.0 mg/dL Final    Nitrite, Urine 01/11/2024 NEGATIVE  NEGATIVE Final    Leukocyte Esterase, Urine 01/11/2024 NEGATIVE  NEGATIVE Final    WBC, Urine 01/11/2024 1-5  1-5, NONE /HPF Final    RBC, Urine 01/11/2024 NONE  NONE, 1-2, 3-5 /HPF Final    Bacteria, Urine 01/11/2024 1+ (A)  NONE SEEN /HPF Final         Assessment/Plan           Dima Mcmillan MD

## 2024-06-29 NOTE — PROGRESS NOTES
See Allergy Shot Immunotherapy Record Book  Allergy checklist done, no concerns                  Subjective   Patient ID:   [id number]   Sarika Figueroa is a 54 y.o. female who presents for No chief complaint on file..    No chief complaint on file.         HPI  This patient is here to evaluate for:      Review of Systems      Objective     There were no vitals taken for this visit.     Physical Exam       Current Outpatient Medications   Medication Sig Dispense Refill    albuterol (ProAir HFA) 90 mcg/actuation inhaler Inhale 1-2 puffs every 4 hours if needed for wheezing. 8.5 g 11    EPINEPHrine (Epipen) 0.3 mg/0.3 mL injection syringe Inject 0.3 mL (0.3 mg) into the muscle if needed for anaphylaxis. Inject into UPPER, OUTER THIGH. Call 911 after use. 2 each 3    montelukast (Singulair) 10 mg tablet Take 1 tablet (10 mg) by mouth once daily as needed (prior to exposure). 30 tablet 5    tocilizumab (Actemra ACTPen) 162 mg/0.9 mL Inject 162 mg (1 pen) under the skin every 14 (fourteen) days. 6 each 1     No current facility-administered medications for this visit.       Summary of the labs over the past 6 months:    Lab on 04/12/2024   Component Date Value Ref Range Status    WBC 04/12/2024 3.5 (L)  4.4 - 11.3 x10*3/uL Final    nRBC 04/12/2024 0.0  0.0 - 0.0 /100 WBCs Final    RBC 04/12/2024 4.34  4.00 - 5.20 x10*6/uL Final    Hemoglobin 04/12/2024 13.6  12.0 - 16.0 g/dL Final    Hematocrit 04/12/2024 41.1  36.0 - 46.0 % Final    MCV 04/12/2024 95  80 - 100 fL Final    MCH 04/12/2024 31.3  26.0 - 34.0 pg Final    MCHC 04/12/2024 33.1  32.0 - 36.0 g/dL Final    RDW 04/12/2024 11.7  11.5 - 14.5 % Final    Platelets 04/12/2024 272  150 - 450 x10*3/uL Final    Glucose 04/12/2024 76  74 - 99 mg/dL Final    Sodium 04/12/2024 141  136 - 145 mmol/L Final    Potassium 04/12/2024 3.9  3.5 - 5.3 mmol/L Final    Chloride 04/12/2024 102  98 - 107 mmol/L Final    Bicarbonate 04/12/2024 30  21 - 32 mmol/L Final    Anion Gap  04/12/2024 13  10 - 20 mmol/L Final    Urea Nitrogen 04/12/2024 16  6 - 23 mg/dL Final    Creatinine 04/12/2024 0.71  0.50 - 1.05 mg/dL Final    eGFR 04/12/2024 >90  >60 mL/min/1.73m*2 Final    Calcium 04/12/2024 9.6  8.6 - 10.6 mg/dL Final    Albumin 04/12/2024 4.6  3.4 - 5.0 g/dL Final    Alkaline Phosphatase 04/12/2024 53  33 - 110 U/L Final    Total Protein 04/12/2024 7.1  6.4 - 8.2 g/dL Final    AST 04/12/2024 23  9 - 39 U/L Final    Bilirubin, Total 04/12/2024 0.6  0.0 - 1.2 mg/dL Final    ALT 04/12/2024 18  7 - 45 U/L Final    Sedimentation Rate 04/12/2024 <1  0 - 30 mm/h Final    C-Reactive Protein 04/12/2024 0.13  <1.00 mg/dL Final   Office Visit on 01/31/2024   Component Date Value Ref Range Status    POC Color, Urine 01/31/2024 Yellow  Straw, Yellow, Light-Yellow Final    POC Appearance, Urine 01/31/2024 Clear  Clear Final    POC Glucose, Urine 01/31/2024 NEGATIVE  NEGATIVE mg/dl Final    POC Bilirubin, Urine 01/31/2024 NEGATIVE  NEGATIVE Final    POC Ketones, Urine 01/31/2024 NEGATIVE  NEGATIVE mg/dl Final    POC Specific Gravity, Urine 01/31/2024 <=1.005  1.005 - 1.035 Final    POC Blood, Urine 01/31/2024 NEGATIVE  NEGATIVE Final    POC PH, Urine 01/31/2024 6.0  No Reference Range Established PH Final    POC Protein, Urine 01/31/2024 NEGATIVE  NEGATIVE, 30 (1+) mg/dl Final    POC Urobilinogen, Urine 01/31/2024 0.2  0.2, 1.0 EU/DL Final    Poc Nitrite, Urine 01/31/2024 NEGATIVE  NEGATIVE Final    POC Leukocytes, Urine 01/31/2024 NEGATIVE  NEGATIVE Final   Admission on 01/22/2024, Discharged on 01/23/2024   Component Date Value Ref Range Status    Color, Urine 01/22/2024 Yellow  Straw, Yellow Final    Appearance, Urine 01/22/2024 Clear  Clear Final    Specific Gravity, Urine 01/22/2024 1.010  1.005 - 1.035 Final    pH, Urine 01/22/2024 7.0  5.0, 5.5, 6.0, 6.5, 7.0, 7.5, 8.0 Final    Protein, Urine 01/22/2024 NEGATIVE  NEGATIVE mg/dL Final    Glucose, Urine 01/22/2024 NEGATIVE  NEGATIVE mg/dL Final     Blood, Urine 01/22/2024 SMALL (1+) (A)  NEGATIVE Final    Ketones, Urine 01/22/2024 NEGATIVE  NEGATIVE mg/dL Final    Bilirubin, Urine 01/22/2024 NEGATIVE  NEGATIVE Final    Urobilinogen, Urine 01/22/2024 <2.0  <2.0 mg/dL Final    Nitrite, Urine 01/22/2024 NEGATIVE  NEGATIVE Final    Leukocyte Esterase, Urine 01/22/2024 TRACE (A)  NEGATIVE Final    WBC 01/22/2024 9.1  4.4 - 11.3 x10*3/uL Final    nRBC 01/22/2024 0.0  0.0 - 0.0 /100 WBCs Final    RBC 01/22/2024 4.54  4.00 - 5.20 x10*6/uL Final    Hemoglobin 01/22/2024 14.3  12.0 - 16.0 g/dL Final    Hematocrit 01/22/2024 43.3  36.0 - 46.0 % Final    MCV 01/22/2024 95  80 - 100 fL Final    MCH 01/22/2024 31.5  26.0 - 34.0 pg Final    MCHC 01/22/2024 33.0  32.0 - 36.0 g/dL Final    RDW 01/22/2024 11.7  11.5 - 14.5 % Final    Platelets 01/22/2024 264  150 - 450 x10*3/uL Final    Neutrophils % 01/22/2024 85.5  40.0 - 80.0 % Final    Immature Granulocytes %, Automated 01/22/2024 1.3 (H)  0.0 - 0.9 % Final    Lymphocytes % 01/22/2024 8.7  13.0 - 44.0 % Final    Monocytes % 01/22/2024 3.3  2.0 - 10.0 % Final    Eosinophils % 01/22/2024 0.8  0.0 - 6.0 % Final    Basophils % 01/22/2024 0.4  0.0 - 2.0 % Final    Neutrophils Absolute 01/22/2024 7.77 (H)  1.20 - 7.70 x10*3/uL Final    Immature Granulocytes Absolute, Au* 01/22/2024 0.12  0.00 - 0.70 x10*3/uL Final    Lymphocytes Absolute 01/22/2024 0.79 (L)  1.20 - 4.80 x10*3/uL Final    Monocytes Absolute 01/22/2024 0.30  0.10 - 1.00 x10*3/uL Final    Eosinophils Absolute 01/22/2024 0.07  0.00 - 0.70 x10*3/uL Final    Basophils Absolute 01/22/2024 0.04  0.00 - 0.10 x10*3/uL Final    Glucose 01/22/2024 91  74 - 99 mg/dL Final    Sodium 01/22/2024 139  136 - 145 mmol/L Final    Potassium 01/22/2024 4.2  3.5 - 5.3 mmol/L Final    Chloride 01/22/2024 100  98 - 107 mmol/L Final    Bicarbonate 01/22/2024 32  21 - 32 mmol/L Final    Anion Gap 01/22/2024 11  10 - 20 mmol/L Final    Urea Nitrogen 01/22/2024 16  6 - 23 mg/dL Final     Creatinine 01/22/2024 0.85  0.50 - 1.05 mg/dL Final    eGFR 01/22/2024 82  >60 mL/min/1.73m*2 Final    Calcium 01/22/2024 9.5  8.6 - 10.3 mg/dL Final    Albumin 01/22/2024 4.5  3.4 - 5.0 g/dL Final    Alkaline Phosphatase 01/22/2024 53  33 - 110 U/L Final    Total Protein 01/22/2024 7.3  6.4 - 8.2 g/dL Final    AST 01/22/2024 21  9 - 39 U/L Final    Bilirubin, Total 01/22/2024 0.7  0.0 - 1.2 mg/dL Final    ALT 01/22/2024 17  7 - 45 U/L Final    Lipase 01/22/2024 25  9 - 82 U/L Final    Lactate 01/22/2024 1.1  0.4 - 2.0 mmol/L Final    WBC, Urine 01/22/2024 1-5  1-5, NONE /HPF Final    RBC, Urine 01/22/2024 1-2  NONE, 1-2, 3-5 /HPF Final    Mucus, Urine 01/22/2024 3+  Reference range not established. /LPF Final   Lab on 01/11/2024   Component Date Value Ref Range Status    WBC 01/11/2024 4.5  4.4 - 11.3 x10*3/uL Final    nRBC 01/11/2024 0.0  0.0 - 0.0 /100 WBCs Final    RBC 01/11/2024 4.16  4.00 - 5.20 x10*6/uL Final    Hemoglobin 01/11/2024 13.6  12.0 - 16.0 g/dL Final    Hematocrit 01/11/2024 40.0  36.0 - 46.0 % Final    MCV 01/11/2024 96  80 - 100 fL Final    MCH 01/11/2024 32.7  26.0 - 34.0 pg Final    MCHC 01/11/2024 34.0  32.0 - 36.0 g/dL Final    RDW 01/11/2024 11.7  11.5 - 14.5 % Final    Platelets 01/11/2024 244  150 - 450 x10*3/uL Final    Glucose 01/11/2024 89  74 - 99 mg/dL Final    Sodium 01/11/2024 139  136 - 145 mmol/L Final    Potassium 01/11/2024 3.8  3.5 - 5.3 mmol/L Final    Chloride 01/11/2024 102  98 - 107 mmol/L Final    Bicarbonate 01/11/2024 28  21 - 32 mmol/L Final    Anion Gap 01/11/2024 13  10 - 20 mmol/L Final    Urea Nitrogen 01/11/2024 15  6 - 23 mg/dL Final    Creatinine 01/11/2024 0.68  0.50 - 1.05 mg/dL Final    eGFR 01/11/2024 >90  >60 mL/min/1.73m*2 Final    Calcium 01/11/2024 9.5  8.6 - 10.6 mg/dL Final    Albumin 01/11/2024 4.6  3.4 - 5.0 g/dL Final    Alkaline Phosphatase 01/11/2024 48  33 - 110 U/L Final    Total Protein 01/11/2024 6.6  6.4 - 8.2 g/dL Final    AST 01/11/2024 20   9 - 39 U/L Final    Bilirubin, Total 01/11/2024 0.8  0.0 - 1.2 mg/dL Final    ALT 01/11/2024 17  7 - 45 U/L Final    Sedimentation Rate 01/11/2024 <1  0 - 30 mm/h Final    C-Reactive Protein 01/11/2024 <0.10  <1.00 mg/dL Final    Color, Urine 01/11/2024 Straw  Straw, Yellow Final    Appearance, Urine 01/11/2024 Clear  Clear Final    Specific Gravity, Urine 01/11/2024 1.004 (N)  1.005 - 1.035 Final    pH, Urine 01/11/2024 6.0  5.0, 5.5, 6.0, 6.5, 7.0, 7.5, 8.0 Final    Protein, Urine 01/11/2024 NEGATIVE  NEGATIVE mg/dL Final    Glucose, Urine 01/11/2024 NEGATIVE  NEGATIVE mg/dL Final    Blood, Urine 01/11/2024 SMALL (1+) (A)  NEGATIVE Final    Ketones, Urine 01/11/2024 NEGATIVE  NEGATIVE mg/dL Final    Bilirubin, Urine 01/11/2024 NEGATIVE  NEGATIVE Final    Urobilinogen, Urine 01/11/2024 <2.0  <2.0 mg/dL Final    Nitrite, Urine 01/11/2024 NEGATIVE  NEGATIVE Final    Leukocyte Esterase, Urine 01/11/2024 NEGATIVE  NEGATIVE Final    WBC, Urine 01/11/2024 1-5  1-5, NONE /HPF Final    RBC, Urine 01/11/2024 NONE  NONE, 1-2, 3-5 /HPF Final    Bacteria, Urine 01/11/2024 1+ (A)  NONE SEEN /HPF Final         Assessment/Plan           Dima Mcmillan MD

## 2024-06-29 NOTE — PROGRESS NOTES
See Allergy Shot Immunotherapy Record Book  Allergy checklist done, no concerns                  Subjective   Patient ID:   [id number]   Sarika Figueroa is a 54 y.o. female who presents for No chief complaint on file..    No chief complaint on file.         HPI  This patient is here to evaluate for:      Review of Systems      Objective     There were no vitals taken for this visit.     Physical Exam       Current Outpatient Medications   Medication Sig Dispense Refill    albuterol (ProAir HFA) 90 mcg/actuation inhaler Inhale 1-2 puffs every 4 hours if needed for wheezing. 8.5 g 11    EPINEPHrine (Epipen) 0.3 mg/0.3 mL injection syringe Inject 0.3 mL (0.3 mg) into the muscle if needed for anaphylaxis. Inject into UPPER, OUTER THIGH. Call 911 after use. 2 each 3    montelukast (Singulair) 10 mg tablet Take 1 tablet (10 mg) by mouth once daily as needed (prior to exposure). 30 tablet 5    tocilizumab (Actemra ACTPen) 162 mg/0.9 mL Inject 162 mg (1 pen) under the skin every 14 (fourteen) days. 6 each 1     No current facility-administered medications for this visit.       Summary of the labs over the past 6 months:    Lab on 04/12/2024   Component Date Value Ref Range Status    WBC 04/12/2024 3.5 (L)  4.4 - 11.3 x10*3/uL Final    nRBC 04/12/2024 0.0  0.0 - 0.0 /100 WBCs Final    RBC 04/12/2024 4.34  4.00 - 5.20 x10*6/uL Final    Hemoglobin 04/12/2024 13.6  12.0 - 16.0 g/dL Final    Hematocrit 04/12/2024 41.1  36.0 - 46.0 % Final    MCV 04/12/2024 95  80 - 100 fL Final    MCH 04/12/2024 31.3  26.0 - 34.0 pg Final    MCHC 04/12/2024 33.1  32.0 - 36.0 g/dL Final    RDW 04/12/2024 11.7  11.5 - 14.5 % Final    Platelets 04/12/2024 272  150 - 450 x10*3/uL Final    Glucose 04/12/2024 76  74 - 99 mg/dL Final    Sodium 04/12/2024 141  136 - 145 mmol/L Final    Potassium 04/12/2024 3.9  3.5 - 5.3 mmol/L Final    Chloride 04/12/2024 102  98 - 107 mmol/L Final    Bicarbonate 04/12/2024 30  21 - 32 mmol/L Final    Anion Gap  04/12/2024 13  10 - 20 mmol/L Final    Urea Nitrogen 04/12/2024 16  6 - 23 mg/dL Final    Creatinine 04/12/2024 0.71  0.50 - 1.05 mg/dL Final    eGFR 04/12/2024 >90  >60 mL/min/1.73m*2 Final    Calcium 04/12/2024 9.6  8.6 - 10.6 mg/dL Final    Albumin 04/12/2024 4.6  3.4 - 5.0 g/dL Final    Alkaline Phosphatase 04/12/2024 53  33 - 110 U/L Final    Total Protein 04/12/2024 7.1  6.4 - 8.2 g/dL Final    AST 04/12/2024 23  9 - 39 U/L Final    Bilirubin, Total 04/12/2024 0.6  0.0 - 1.2 mg/dL Final    ALT 04/12/2024 18  7 - 45 U/L Final    Sedimentation Rate 04/12/2024 <1  0 - 30 mm/h Final    C-Reactive Protein 04/12/2024 0.13  <1.00 mg/dL Final   Office Visit on 01/31/2024   Component Date Value Ref Range Status    POC Color, Urine 01/31/2024 Yellow  Straw, Yellow, Light-Yellow Final    POC Appearance, Urine 01/31/2024 Clear  Clear Final    POC Glucose, Urine 01/31/2024 NEGATIVE  NEGATIVE mg/dl Final    POC Bilirubin, Urine 01/31/2024 NEGATIVE  NEGATIVE Final    POC Ketones, Urine 01/31/2024 NEGATIVE  NEGATIVE mg/dl Final    POC Specific Gravity, Urine 01/31/2024 <=1.005  1.005 - 1.035 Final    POC Blood, Urine 01/31/2024 NEGATIVE  NEGATIVE Final    POC PH, Urine 01/31/2024 6.0  No Reference Range Established PH Final    POC Protein, Urine 01/31/2024 NEGATIVE  NEGATIVE, 30 (1+) mg/dl Final    POC Urobilinogen, Urine 01/31/2024 0.2  0.2, 1.0 EU/DL Final    Poc Nitrite, Urine 01/31/2024 NEGATIVE  NEGATIVE Final    POC Leukocytes, Urine 01/31/2024 NEGATIVE  NEGATIVE Final   Admission on 01/22/2024, Discharged on 01/23/2024   Component Date Value Ref Range Status    Color, Urine 01/22/2024 Yellow  Straw, Yellow Final    Appearance, Urine 01/22/2024 Clear  Clear Final    Specific Gravity, Urine 01/22/2024 1.010  1.005 - 1.035 Final    pH, Urine 01/22/2024 7.0  5.0, 5.5, 6.0, 6.5, 7.0, 7.5, 8.0 Final    Protein, Urine 01/22/2024 NEGATIVE  NEGATIVE mg/dL Final    Glucose, Urine 01/22/2024 NEGATIVE  NEGATIVE mg/dL Final     Blood, Urine 01/22/2024 SMALL (1+) (A)  NEGATIVE Final    Ketones, Urine 01/22/2024 NEGATIVE  NEGATIVE mg/dL Final    Bilirubin, Urine 01/22/2024 NEGATIVE  NEGATIVE Final    Urobilinogen, Urine 01/22/2024 <2.0  <2.0 mg/dL Final    Nitrite, Urine 01/22/2024 NEGATIVE  NEGATIVE Final    Leukocyte Esterase, Urine 01/22/2024 TRACE (A)  NEGATIVE Final    WBC 01/22/2024 9.1  4.4 - 11.3 x10*3/uL Final    nRBC 01/22/2024 0.0  0.0 - 0.0 /100 WBCs Final    RBC 01/22/2024 4.54  4.00 - 5.20 x10*6/uL Final    Hemoglobin 01/22/2024 14.3  12.0 - 16.0 g/dL Final    Hematocrit 01/22/2024 43.3  36.0 - 46.0 % Final    MCV 01/22/2024 95  80 - 100 fL Final    MCH 01/22/2024 31.5  26.0 - 34.0 pg Final    MCHC 01/22/2024 33.0  32.0 - 36.0 g/dL Final    RDW 01/22/2024 11.7  11.5 - 14.5 % Final    Platelets 01/22/2024 264  150 - 450 x10*3/uL Final    Neutrophils % 01/22/2024 85.5  40.0 - 80.0 % Final    Immature Granulocytes %, Automated 01/22/2024 1.3 (H)  0.0 - 0.9 % Final    Lymphocytes % 01/22/2024 8.7  13.0 - 44.0 % Final    Monocytes % 01/22/2024 3.3  2.0 - 10.0 % Final    Eosinophils % 01/22/2024 0.8  0.0 - 6.0 % Final    Basophils % 01/22/2024 0.4  0.0 - 2.0 % Final    Neutrophils Absolute 01/22/2024 7.77 (H)  1.20 - 7.70 x10*3/uL Final    Immature Granulocytes Absolute, Au* 01/22/2024 0.12  0.00 - 0.70 x10*3/uL Final    Lymphocytes Absolute 01/22/2024 0.79 (L)  1.20 - 4.80 x10*3/uL Final    Monocytes Absolute 01/22/2024 0.30  0.10 - 1.00 x10*3/uL Final    Eosinophils Absolute 01/22/2024 0.07  0.00 - 0.70 x10*3/uL Final    Basophils Absolute 01/22/2024 0.04  0.00 - 0.10 x10*3/uL Final    Glucose 01/22/2024 91  74 - 99 mg/dL Final    Sodium 01/22/2024 139  136 - 145 mmol/L Final    Potassium 01/22/2024 4.2  3.5 - 5.3 mmol/L Final    Chloride 01/22/2024 100  98 - 107 mmol/L Final    Bicarbonate 01/22/2024 32  21 - 32 mmol/L Final    Anion Gap 01/22/2024 11  10 - 20 mmol/L Final    Urea Nitrogen 01/22/2024 16  6 - 23 mg/dL Final     Creatinine 01/22/2024 0.85  0.50 - 1.05 mg/dL Final    eGFR 01/22/2024 82  >60 mL/min/1.73m*2 Final    Calcium 01/22/2024 9.5  8.6 - 10.3 mg/dL Final    Albumin 01/22/2024 4.5  3.4 - 5.0 g/dL Final    Alkaline Phosphatase 01/22/2024 53  33 - 110 U/L Final    Total Protein 01/22/2024 7.3  6.4 - 8.2 g/dL Final    AST 01/22/2024 21  9 - 39 U/L Final    Bilirubin, Total 01/22/2024 0.7  0.0 - 1.2 mg/dL Final    ALT 01/22/2024 17  7 - 45 U/L Final    Lipase 01/22/2024 25  9 - 82 U/L Final    Lactate 01/22/2024 1.1  0.4 - 2.0 mmol/L Final    WBC, Urine 01/22/2024 1-5  1-5, NONE /HPF Final    RBC, Urine 01/22/2024 1-2  NONE, 1-2, 3-5 /HPF Final    Mucus, Urine 01/22/2024 3+  Reference range not established. /LPF Final   Lab on 01/11/2024   Component Date Value Ref Range Status    WBC 01/11/2024 4.5  4.4 - 11.3 x10*3/uL Final    nRBC 01/11/2024 0.0  0.0 - 0.0 /100 WBCs Final    RBC 01/11/2024 4.16  4.00 - 5.20 x10*6/uL Final    Hemoglobin 01/11/2024 13.6  12.0 - 16.0 g/dL Final    Hematocrit 01/11/2024 40.0  36.0 - 46.0 % Final    MCV 01/11/2024 96  80 - 100 fL Final    MCH 01/11/2024 32.7  26.0 - 34.0 pg Final    MCHC 01/11/2024 34.0  32.0 - 36.0 g/dL Final    RDW 01/11/2024 11.7  11.5 - 14.5 % Final    Platelets 01/11/2024 244  150 - 450 x10*3/uL Final    Glucose 01/11/2024 89  74 - 99 mg/dL Final    Sodium 01/11/2024 139  136 - 145 mmol/L Final    Potassium 01/11/2024 3.8  3.5 - 5.3 mmol/L Final    Chloride 01/11/2024 102  98 - 107 mmol/L Final    Bicarbonate 01/11/2024 28  21 - 32 mmol/L Final    Anion Gap 01/11/2024 13  10 - 20 mmol/L Final    Urea Nitrogen 01/11/2024 15  6 - 23 mg/dL Final    Creatinine 01/11/2024 0.68  0.50 - 1.05 mg/dL Final    eGFR 01/11/2024 >90  >60 mL/min/1.73m*2 Final    Calcium 01/11/2024 9.5  8.6 - 10.6 mg/dL Final    Albumin 01/11/2024 4.6  3.4 - 5.0 g/dL Final    Alkaline Phosphatase 01/11/2024 48  33 - 110 U/L Final    Total Protein 01/11/2024 6.6  6.4 - 8.2 g/dL Final    AST 01/11/2024 20   9 - 39 U/L Final    Bilirubin, Total 01/11/2024 0.8  0.0 - 1.2 mg/dL Final    ALT 01/11/2024 17  7 - 45 U/L Final    Sedimentation Rate 01/11/2024 <1  0 - 30 mm/h Final    C-Reactive Protein 01/11/2024 <0.10  <1.00 mg/dL Final    Color, Urine 01/11/2024 Straw  Straw, Yellow Final    Appearance, Urine 01/11/2024 Clear  Clear Final    Specific Gravity, Urine 01/11/2024 1.004 (N)  1.005 - 1.035 Final    pH, Urine 01/11/2024 6.0  5.0, 5.5, 6.0, 6.5, 7.0, 7.5, 8.0 Final    Protein, Urine 01/11/2024 NEGATIVE  NEGATIVE mg/dL Final    Glucose, Urine 01/11/2024 NEGATIVE  NEGATIVE mg/dL Final    Blood, Urine 01/11/2024 SMALL (1+) (A)  NEGATIVE Final    Ketones, Urine 01/11/2024 NEGATIVE  NEGATIVE mg/dL Final    Bilirubin, Urine 01/11/2024 NEGATIVE  NEGATIVE Final    Urobilinogen, Urine 01/11/2024 <2.0  <2.0 mg/dL Final    Nitrite, Urine 01/11/2024 NEGATIVE  NEGATIVE Final    Leukocyte Esterase, Urine 01/11/2024 NEGATIVE  NEGATIVE Final    WBC, Urine 01/11/2024 1-5  1-5, NONE /HPF Final    RBC, Urine 01/11/2024 NONE  NONE, 1-2, 3-5 /HPF Final    Bacteria, Urine 01/11/2024 1+ (A)  NONE SEEN /HPF Final         Assessment/Plan           Dima Mcmillan MD

## 2024-07-08 ENCOUNTER — APPOINTMENT (OUTPATIENT)
Dept: OBSTETRICS AND GYNECOLOGY | Facility: CLINIC | Age: 54
End: 2024-07-08
Payer: COMMERCIAL

## 2024-07-08 ENCOUNTER — OFFICE VISIT (OUTPATIENT)
Dept: ALLERGY | Facility: CLINIC | Age: 54
End: 2024-07-08
Payer: COMMERCIAL

## 2024-07-08 DIAGNOSIS — J30.89 ALLERGIC RHINITIS DUE TO OTHER ALLERGIC TRIGGER, UNSPECIFIED SEASONALITY: Primary | ICD-10-CM

## 2024-07-08 PROCEDURE — 95117 IMMUNOTHERAPY INJECTIONS: CPT | Performed by: ALLERGY & IMMUNOLOGY

## 2024-07-09 ENCOUNTER — SPECIALTY PHARMACY (OUTPATIENT)
Dept: PHARMACY | Facility: CLINIC | Age: 54
End: 2024-07-09

## 2024-07-15 ENCOUNTER — SPECIALTY PHARMACY (OUTPATIENT)
Dept: PHARMACY | Facility: CLINIC | Age: 54
End: 2024-07-15

## 2024-07-15 PROCEDURE — RXMED WILLOW AMBULATORY MEDICATION CHARGE

## 2024-07-16 ENCOUNTER — PHARMACY VISIT (OUTPATIENT)
Dept: PHARMACY | Facility: CLINIC | Age: 54
End: 2024-07-16
Payer: COMMERCIAL

## 2024-07-22 ENCOUNTER — APPOINTMENT (OUTPATIENT)
Dept: ALLERGY | Facility: CLINIC | Age: 54
End: 2024-07-22
Payer: COMMERCIAL

## 2024-07-29 ENCOUNTER — APPOINTMENT (OUTPATIENT)
Dept: ALLERGY | Facility: CLINIC | Age: 54
End: 2024-07-29
Payer: COMMERCIAL

## 2024-07-29 NOTE — PROGRESS NOTES
See Allergy Shot Immunotherapy Record Book  Allergy checklist done, no concerns                  Subjective   Patient ID:   [id number]   Sarika Figueroa is a 54 y.o. female who presents for No chief complaint on file..    No chief complaint on file.         HPI  This patient is here to evaluate for:      Review of Systems      Objective     There were no vitals taken for this visit.     Physical Exam       Current Outpatient Medications   Medication Sig Dispense Refill    albuterol (ProAir HFA) 90 mcg/actuation inhaler Inhale 1-2 puffs every 4 hours if needed for wheezing. 8.5 g 11    EPINEPHrine (Epipen) 0.3 mg/0.3 mL injection syringe Inject 0.3 mL (0.3 mg) into the muscle if needed for anaphylaxis. Inject into UPPER, OUTER THIGH. Call 911 after use. 2 each 3    montelukast (Singulair) 10 mg tablet Take 1 tablet (10 mg) by mouth once daily as needed (prior to exposure). 30 tablet 5    tocilizumab (Actemra ACTPen) 162 mg/0.9 mL Inject 162 mg (1 pen) under the skin every 14 (fourteen) days. 6 each 1     No current facility-administered medications for this visit.       Summary of the labs over the past 6 months:    Lab on 04/12/2024   Component Date Value Ref Range Status    WBC 04/12/2024 3.5 (L)  4.4 - 11.3 x10*3/uL Final    nRBC 04/12/2024 0.0  0.0 - 0.0 /100 WBCs Final    RBC 04/12/2024 4.34  4.00 - 5.20 x10*6/uL Final    Hemoglobin 04/12/2024 13.6  12.0 - 16.0 g/dL Final    Hematocrit 04/12/2024 41.1  36.0 - 46.0 % Final    MCV 04/12/2024 95  80 - 100 fL Final    MCH 04/12/2024 31.3  26.0 - 34.0 pg Final    MCHC 04/12/2024 33.1  32.0 - 36.0 g/dL Final    RDW 04/12/2024 11.7  11.5 - 14.5 % Final    Platelets 04/12/2024 272  150 - 450 x10*3/uL Final    Glucose 04/12/2024 76  74 - 99 mg/dL Final    Sodium 04/12/2024 141  136 - 145 mmol/L Final    Potassium 04/12/2024 3.9  3.5 - 5.3 mmol/L Final    Chloride 04/12/2024 102  98 - 107 mmol/L Final    Bicarbonate 04/12/2024 30  21 - 32 mmol/L Final    Anion Gap  04/12/2024 13  10 - 20 mmol/L Final    Urea Nitrogen 04/12/2024 16  6 - 23 mg/dL Final    Creatinine 04/12/2024 0.71  0.50 - 1.05 mg/dL Final    eGFR 04/12/2024 >90  >60 mL/min/1.73m*2 Final    Calcium 04/12/2024 9.6  8.6 - 10.6 mg/dL Final    Albumin 04/12/2024 4.6  3.4 - 5.0 g/dL Final    Alkaline Phosphatase 04/12/2024 53  33 - 110 U/L Final    Total Protein 04/12/2024 7.1  6.4 - 8.2 g/dL Final    AST 04/12/2024 23  9 - 39 U/L Final    Bilirubin, Total 04/12/2024 0.6  0.0 - 1.2 mg/dL Final    ALT 04/12/2024 18  7 - 45 U/L Final    Sedimentation Rate 04/12/2024 <1  0 - 30 mm/h Final    C-Reactive Protein 04/12/2024 0.13  <1.00 mg/dL Final   Office Visit on 01/31/2024   Component Date Value Ref Range Status    POC Color, Urine 01/31/2024 Yellow  Straw, Yellow, Light-Yellow Final    POC Appearance, Urine 01/31/2024 Clear  Clear Final    POC Glucose, Urine 01/31/2024 NEGATIVE  NEGATIVE mg/dl Final    POC Bilirubin, Urine 01/31/2024 NEGATIVE  NEGATIVE Final    POC Ketones, Urine 01/31/2024 NEGATIVE  NEGATIVE mg/dl Final    POC Specific Gravity, Urine 01/31/2024 <=1.005  1.005 - 1.035 Final    POC Blood, Urine 01/31/2024 NEGATIVE  NEGATIVE Final    POC PH, Urine 01/31/2024 6.0  No Reference Range Established PH Final    POC Protein, Urine 01/31/2024 NEGATIVE  NEGATIVE, 30 (1+) mg/dl Final    POC Urobilinogen, Urine 01/31/2024 0.2  0.2, 1.0 EU/DL Final    Poc Nitrite, Urine 01/31/2024 NEGATIVE  NEGATIVE Final    POC Leukocytes, Urine 01/31/2024 NEGATIVE  NEGATIVE Final   Admission on 01/22/2024, Discharged on 01/23/2024   Component Date Value Ref Range Status    Color, Urine 01/22/2024 Yellow  Straw, Yellow Final    Appearance, Urine 01/22/2024 Clear  Clear Final    Specific Gravity, Urine 01/22/2024 1.010  1.005 - 1.035 Final    pH, Urine 01/22/2024 7.0  5.0, 5.5, 6.0, 6.5, 7.0, 7.5, 8.0 Final    Protein, Urine 01/22/2024 NEGATIVE  NEGATIVE mg/dL Final    Glucose, Urine 01/22/2024 NEGATIVE  NEGATIVE mg/dL Final     Blood, Urine 01/22/2024 SMALL (1+) (A)  NEGATIVE Final    Ketones, Urine 01/22/2024 NEGATIVE  NEGATIVE mg/dL Final    Bilirubin, Urine 01/22/2024 NEGATIVE  NEGATIVE Final    Urobilinogen, Urine 01/22/2024 <2.0  <2.0 mg/dL Final    Nitrite, Urine 01/22/2024 NEGATIVE  NEGATIVE Final    Leukocyte Esterase, Urine 01/22/2024 TRACE (A)  NEGATIVE Final    WBC 01/22/2024 9.1  4.4 - 11.3 x10*3/uL Final    nRBC 01/22/2024 0.0  0.0 - 0.0 /100 WBCs Final    RBC 01/22/2024 4.54  4.00 - 5.20 x10*6/uL Final    Hemoglobin 01/22/2024 14.3  12.0 - 16.0 g/dL Final    Hematocrit 01/22/2024 43.3  36.0 - 46.0 % Final    MCV 01/22/2024 95  80 - 100 fL Final    MCH 01/22/2024 31.5  26.0 - 34.0 pg Final    MCHC 01/22/2024 33.0  32.0 - 36.0 g/dL Final    RDW 01/22/2024 11.7  11.5 - 14.5 % Final    Platelets 01/22/2024 264  150 - 450 x10*3/uL Final    Neutrophils % 01/22/2024 85.5  40.0 - 80.0 % Final    Immature Granulocytes %, Automated 01/22/2024 1.3 (H)  0.0 - 0.9 % Final    Lymphocytes % 01/22/2024 8.7  13.0 - 44.0 % Final    Monocytes % 01/22/2024 3.3  2.0 - 10.0 % Final    Eosinophils % 01/22/2024 0.8  0.0 - 6.0 % Final    Basophils % 01/22/2024 0.4  0.0 - 2.0 % Final    Neutrophils Absolute 01/22/2024 7.77 (H)  1.20 - 7.70 x10*3/uL Final    Immature Granulocytes Absolute, Au* 01/22/2024 0.12  0.00 - 0.70 x10*3/uL Final    Lymphocytes Absolute 01/22/2024 0.79 (L)  1.20 - 4.80 x10*3/uL Final    Monocytes Absolute 01/22/2024 0.30  0.10 - 1.00 x10*3/uL Final    Eosinophils Absolute 01/22/2024 0.07  0.00 - 0.70 x10*3/uL Final    Basophils Absolute 01/22/2024 0.04  0.00 - 0.10 x10*3/uL Final    Glucose 01/22/2024 91  74 - 99 mg/dL Final    Sodium 01/22/2024 139  136 - 145 mmol/L Final    Potassium 01/22/2024 4.2  3.5 - 5.3 mmol/L Final    Chloride 01/22/2024 100  98 - 107 mmol/L Final    Bicarbonate 01/22/2024 32  21 - 32 mmol/L Final    Anion Gap 01/22/2024 11  10 - 20 mmol/L Final    Urea Nitrogen 01/22/2024 16  6 - 23 mg/dL Final     Creatinine 01/22/2024 0.85  0.50 - 1.05 mg/dL Final    eGFR 01/22/2024 82  >60 mL/min/1.73m*2 Final    Calcium 01/22/2024 9.5  8.6 - 10.3 mg/dL Final    Albumin 01/22/2024 4.5  3.4 - 5.0 g/dL Final    Alkaline Phosphatase 01/22/2024 53  33 - 110 U/L Final    Total Protein 01/22/2024 7.3  6.4 - 8.2 g/dL Final    AST 01/22/2024 21  9 - 39 U/L Final    Bilirubin, Total 01/22/2024 0.7  0.0 - 1.2 mg/dL Final    ALT 01/22/2024 17  7 - 45 U/L Final    Lipase 01/22/2024 25  9 - 82 U/L Final    Lactate 01/22/2024 1.1  0.4 - 2.0 mmol/L Final    WBC, Urine 01/22/2024 1-5  1-5, NONE /HPF Final    RBC, Urine 01/22/2024 1-2  NONE, 1-2, 3-5 /HPF Final    Mucus, Urine 01/22/2024 3+  Reference range not established. /LPF Final   Lab on 01/11/2024   Component Date Value Ref Range Status    WBC 01/11/2024 4.5  4.4 - 11.3 x10*3/uL Final    nRBC 01/11/2024 0.0  0.0 - 0.0 /100 WBCs Final    RBC 01/11/2024 4.16  4.00 - 5.20 x10*6/uL Final    Hemoglobin 01/11/2024 13.6  12.0 - 16.0 g/dL Final    Hematocrit 01/11/2024 40.0  36.0 - 46.0 % Final    MCV 01/11/2024 96  80 - 100 fL Final    MCH 01/11/2024 32.7  26.0 - 34.0 pg Final    MCHC 01/11/2024 34.0  32.0 - 36.0 g/dL Final    RDW 01/11/2024 11.7  11.5 - 14.5 % Final    Platelets 01/11/2024 244  150 - 450 x10*3/uL Final    Glucose 01/11/2024 89  74 - 99 mg/dL Final    Sodium 01/11/2024 139  136 - 145 mmol/L Final    Potassium 01/11/2024 3.8  3.5 - 5.3 mmol/L Final    Chloride 01/11/2024 102  98 - 107 mmol/L Final    Bicarbonate 01/11/2024 28  21 - 32 mmol/L Final    Anion Gap 01/11/2024 13  10 - 20 mmol/L Final    Urea Nitrogen 01/11/2024 15  6 - 23 mg/dL Final    Creatinine 01/11/2024 0.68  0.50 - 1.05 mg/dL Final    eGFR 01/11/2024 >90  >60 mL/min/1.73m*2 Final    Calcium 01/11/2024 9.5  8.6 - 10.6 mg/dL Final    Albumin 01/11/2024 4.6  3.4 - 5.0 g/dL Final    Alkaline Phosphatase 01/11/2024 48  33 - 110 U/L Final    Total Protein 01/11/2024 6.6  6.4 - 8.2 g/dL Final    AST 01/11/2024 20   9 - 39 U/L Final    Bilirubin, Total 01/11/2024 0.8  0.0 - 1.2 mg/dL Final    ALT 01/11/2024 17  7 - 45 U/L Final    Sedimentation Rate 01/11/2024 <1  0 - 30 mm/h Final    C-Reactive Protein 01/11/2024 <0.10  <1.00 mg/dL Final    Color, Urine 01/11/2024 Straw  Straw, Yellow Final    Appearance, Urine 01/11/2024 Clear  Clear Final    Specific Gravity, Urine 01/11/2024 1.004 (N)  1.005 - 1.035 Final    pH, Urine 01/11/2024 6.0  5.0, 5.5, 6.0, 6.5, 7.0, 7.5, 8.0 Final    Protein, Urine 01/11/2024 NEGATIVE  NEGATIVE mg/dL Final    Glucose, Urine 01/11/2024 NEGATIVE  NEGATIVE mg/dL Final    Blood, Urine 01/11/2024 SMALL (1+) (A)  NEGATIVE Final    Ketones, Urine 01/11/2024 NEGATIVE  NEGATIVE mg/dL Final    Bilirubin, Urine 01/11/2024 NEGATIVE  NEGATIVE Final    Urobilinogen, Urine 01/11/2024 <2.0  <2.0 mg/dL Final    Nitrite, Urine 01/11/2024 NEGATIVE  NEGATIVE Final    Leukocyte Esterase, Urine 01/11/2024 NEGATIVE  NEGATIVE Final    WBC, Urine 01/11/2024 1-5  1-5, NONE /HPF Final    RBC, Urine 01/11/2024 NONE  NONE, 1-2, 3-5 /HPF Final    Bacteria, Urine 01/11/2024 1+ (A)  NONE SEEN /HPF Final         Assessment/Plan           Dima Mcmillan MD

## 2024-08-05 ENCOUNTER — APPOINTMENT (OUTPATIENT)
Dept: ALLERGY | Facility: CLINIC | Age: 54
End: 2024-08-05
Payer: COMMERCIAL

## 2024-08-05 DIAGNOSIS — J30.89 ALLERGIC RHINITIS DUE TO OTHER ALLERGIC TRIGGER, UNSPECIFIED SEASONALITY: ICD-10-CM

## 2024-08-09 ENCOUNTER — SPECIALTY PHARMACY (OUTPATIENT)
Dept: PHARMACY | Facility: CLINIC | Age: 54
End: 2024-08-09

## 2024-08-09 PROCEDURE — RXMED WILLOW AMBULATORY MEDICATION CHARGE

## 2024-08-12 ENCOUNTER — APPOINTMENT (OUTPATIENT)
Dept: ALLERGY | Facility: CLINIC | Age: 54
End: 2024-08-12
Payer: COMMERCIAL

## 2024-08-12 DIAGNOSIS — J30.89 ALLERGIC RHINITIS DUE TO OTHER ALLERGIC TRIGGER, UNSPECIFIED SEASONALITY: ICD-10-CM

## 2024-08-12 PROCEDURE — 95117 IMMUNOTHERAPY INJECTIONS: CPT | Performed by: ALLERGY & IMMUNOLOGY

## 2024-08-19 ENCOUNTER — APPOINTMENT (OUTPATIENT)
Dept: ALLERGY | Facility: CLINIC | Age: 54
End: 2024-08-19
Payer: COMMERCIAL

## 2024-08-19 DIAGNOSIS — J30.89 ALLERGIC RHINITIS DUE TO OTHER ALLERGIC TRIGGER, UNSPECIFIED SEASONALITY: ICD-10-CM

## 2024-08-19 PROCEDURE — 95117 IMMUNOTHERAPY INJECTIONS: CPT | Performed by: ALLERGY & IMMUNOLOGY

## 2024-08-19 NOTE — PROGRESS NOTES
Subjective   Patient ID:   [id number]   Sarika Figueroa is a 54 y.o. female who presents for No chief complaint on file..    No chief complaint on file.         HPI  This patient is here to evaluate for:      Review of Systems      Objective     There were no vitals taken for this visit.     Physical Exam       Current Outpatient Medications   Medication Sig Dispense Refill    albuterol (ProAir HFA) 90 mcg/actuation inhaler Inhale 1-2 puffs every 4 hours if needed for wheezing. 8.5 g 11    EPINEPHrine (Epipen) 0.3 mg/0.3 mL injection syringe Inject 0.3 mL (0.3 mg) into the muscle if needed for anaphylaxis. Inject into UPPER, OUTER THIGH. Call 911 after use. 2 each 3    montelukast (Singulair) 10 mg tablet Take 1 tablet (10 mg) by mouth once daily as needed (prior to exposure). 30 tablet 5    tocilizumab (Actemra ACTPen) 162 mg/0.9 mL Inject 162 mg (1 pen) under the skin every 14 (fourteen) days. 6 each 1     No current facility-administered medications for this visit.       Summary of the labs over the past 6 months:    Lab on 04/12/2024   Component Date Value Ref Range Status    WBC 04/12/2024 3.5 (L)  4.4 - 11.3 x10*3/uL Final    nRBC 04/12/2024 0.0  0.0 - 0.0 /100 WBCs Final    RBC 04/12/2024 4.34  4.00 - 5.20 x10*6/uL Final    Hemoglobin 04/12/2024 13.6  12.0 - 16.0 g/dL Final    Hematocrit 04/12/2024 41.1  36.0 - 46.0 % Final    MCV 04/12/2024 95  80 - 100 fL Final    MCH 04/12/2024 31.3  26.0 - 34.0 pg Final    MCHC 04/12/2024 33.1  32.0 - 36.0 g/dL Final    RDW 04/12/2024 11.7  11.5 - 14.5 % Final    Platelets 04/12/2024 272  150 - 450 x10*3/uL Final    Glucose 04/12/2024 76  74 - 99 mg/dL Final    Sodium 04/12/2024 141  136 - 145 mmol/L Final    Potassium 04/12/2024 3.9  3.5 - 5.3 mmol/L Final    Chloride 04/12/2024 102  98 - 107 mmol/L Final    Bicarbonate 04/12/2024 30  21 - 32 mmol/L Final    Anion Gap 04/12/2024 13  10 - 20 mmol/L Final    Urea Nitrogen 04/12/2024 16  6 - 23 mg/dL Final    Creatinine  04/12/2024 0.71  0.50 - 1.05 mg/dL Final    eGFR 04/12/2024 >90  >60 mL/min/1.73m*2 Final    Calcium 04/12/2024 9.6  8.6 - 10.6 mg/dL Final    Albumin 04/12/2024 4.6  3.4 - 5.0 g/dL Final    Alkaline Phosphatase 04/12/2024 53  33 - 110 U/L Final    Total Protein 04/12/2024 7.1  6.4 - 8.2 g/dL Final    AST 04/12/2024 23  9 - 39 U/L Final    Bilirubin, Total 04/12/2024 0.6  0.0 - 1.2 mg/dL Final    ALT 04/12/2024 18  7 - 45 U/L Final    Sedimentation Rate 04/12/2024 <1  0 - 30 mm/h Final    C-Reactive Protein 04/12/2024 0.13  <1.00 mg/dL Final         Assessment/Plan           Dima Mcmillan MD

## 2024-08-21 ENCOUNTER — PHARMACY VISIT (OUTPATIENT)
Dept: PHARMACY | Facility: CLINIC | Age: 54
End: 2024-08-21
Payer: COMMERCIAL

## 2024-08-26 ENCOUNTER — APPOINTMENT (OUTPATIENT)
Dept: ALLERGY | Facility: CLINIC | Age: 54
End: 2024-08-26
Payer: COMMERCIAL

## 2024-09-16 PROCEDURE — RXMED WILLOW AMBULATORY MEDICATION CHARGE

## 2024-09-19 ENCOUNTER — SPECIALTY PHARMACY (OUTPATIENT)
Dept: PHARMACY | Facility: CLINIC | Age: 54
End: 2024-09-19

## 2024-09-20 ENCOUNTER — TELEMEDICINE CLINICAL SUPPORT (OUTPATIENT)
Dept: PHARMACY | Facility: HOSPITAL | Age: 54
End: 2024-09-20
Payer: COMMERCIAL

## 2024-09-20 NOTE — PROGRESS NOTES
[facility] Clinical Note    Sarika Figueroa is a 54 y.o. female, who is on the specialty pharmacy service for management of:  Rheumatology Core.    Sarika Figueroa is taking: Actemra.      Sarika was contacted on 9/20/2024 at 9:23 AM for a virtual pharmacy visit with encounter number [id number] from:   [facility]  [id number]  [address]  [facility] [address]  Dept: [phone]  Dept Fax: [phone]  Loc: [phone]    Sarika was offered a Telemedicine Video visit or Telephone visit.  Sarika consented to a Telephone visit, which was performed.    The most recent encounter visit with the referring prescriber Alisson Bran on 1/11/24 (Date) was reviewed.  Pharmacy will continue to collaborate in the care of this patient with the referring prescriber Alisson Bran.          Impression/Plan  Is patient high risk (potential patients:  pregnancy, geriatric, pediatric)?  no  Is laboratory follow-up needed? Per MD  Is a clinical intervention needed? no  Next reassessment date? 6 months  Additional comments: Pt will be following with provider to discuss efficacy of medication. Pt states she doesn't believe it is working.     Refer to the encounter summary report for documentation details about patient counseling and education.      Medication Adherence    The importance of adherence was discussed with the patient and they were advised to take the medication as prescribed by their provider. Patient was encouraged to call their physician's office if they have a question regarding a missed dose.     QOL/Patient Satisfaction  Rate your quality of life on scale of 1-10: 2  Rate your satisfaction with  Specialty Pharmacy on scale of 1-10: 10 - Completely satisfied      Patient was advised to contact the pharmacy if there are any changes to their medication list, including prescriptions, OTC medications, herbal products,  or supplements. Patient was advised of [facility]'s dispensing process, refill timeline, contact information ([phone]), and patient management follow up. Patient confirmed understanding of education conducted during assessment. All patient questions and concerns were addressed to the best of my ability. Patient was encouraged to contact the specialty pharmacy with any questions or concerns.    Confirmed follow-up outreaches are properly scheduled and reviewed goals of therapy with the patient.        Otis Garcia, PharmD

## 2024-09-23 ENCOUNTER — CLINICAL SUPPORT (OUTPATIENT)
Dept: ALLERGY | Facility: CLINIC | Age: 54
End: 2024-09-23
Payer: COMMERCIAL

## 2024-09-23 ENCOUNTER — PHARMACY VISIT (OUTPATIENT)
Dept: PHARMACY | Facility: CLINIC | Age: 54
End: 2024-09-23
Payer: COMMERCIAL

## 2024-09-23 DIAGNOSIS — J30.89 ALLERGIC RHINITIS DUE TO OTHER ALLERGIC TRIGGER, UNSPECIFIED SEASONALITY: ICD-10-CM

## 2024-09-23 PROCEDURE — 95117 IMMUNOTHERAPY INJECTIONS: CPT | Performed by: ALLERGY & IMMUNOLOGY

## 2024-09-30 ENCOUNTER — APPOINTMENT (OUTPATIENT)
Dept: ALLERGY | Facility: CLINIC | Age: 54
End: 2024-09-30
Payer: COMMERCIAL

## 2024-10-07 ENCOUNTER — APPOINTMENT (OUTPATIENT)
Dept: ALLERGY | Facility: CLINIC | Age: 54
End: 2024-10-07
Payer: COMMERCIAL

## 2024-10-07 DIAGNOSIS — J30.89 ALLERGIC RHINITIS DUE TO OTHER ALLERGIC TRIGGER, UNSPECIFIED SEASONALITY: ICD-10-CM

## 2024-10-07 PROCEDURE — 95117 IMMUNOTHERAPY INJECTIONS: CPT | Performed by: ALLERGY & IMMUNOLOGY

## 2024-10-14 ENCOUNTER — APPOINTMENT (OUTPATIENT)
Dept: ALLERGY | Facility: CLINIC | Age: 54
End: 2024-10-14
Payer: COMMERCIAL

## 2024-10-14 DIAGNOSIS — J30.89 ALLERGIC RHINITIS DUE TO OTHER ALLERGIC TRIGGER, UNSPECIFIED SEASONALITY: ICD-10-CM

## 2024-10-14 PROCEDURE — 95117 IMMUNOTHERAPY INJECTIONS: CPT | Performed by: ALLERGY & IMMUNOLOGY

## 2024-10-21 ENCOUNTER — SPECIALTY PHARMACY (OUTPATIENT)
Dept: PHARMACY | Facility: CLINIC | Age: 54
End: 2024-10-21

## 2024-10-28 ENCOUNTER — APPOINTMENT (OUTPATIENT)
Dept: ALLERGY | Facility: CLINIC | Age: 54
End: 2024-10-28
Payer: COMMERCIAL

## 2024-10-28 DIAGNOSIS — J30.89 ALLERGIC RHINITIS DUE TO DUST: ICD-10-CM

## 2024-10-28 DIAGNOSIS — J30.81 ALLERGIC RHINITIS DUE TO ANIMAL HAIR AND DANDER: ICD-10-CM

## 2024-10-28 DIAGNOSIS — H10.45 CHRONIC ALLERGIC CONJUNCTIVITIS: ICD-10-CM

## 2024-10-28 DIAGNOSIS — J30.89 ALLERGIC RHINITIS DUE TO OTHER ALLERGIC TRIGGER, UNSPECIFIED SEASONALITY: Primary | ICD-10-CM

## 2024-10-28 PROCEDURE — 95004 PERQ TESTS W/ALRGNC XTRCS: CPT | Performed by: ALLERGY & IMMUNOLOGY

## 2024-10-28 PROCEDURE — 95117 IMMUNOTHERAPY INJECTIONS: CPT | Performed by: ALLERGY & IMMUNOLOGY

## 2024-10-28 PROCEDURE — 99214 OFFICE O/P EST MOD 30 MIN: CPT | Performed by: ALLERGY & IMMUNOLOGY

## 2024-10-28 RX ORDER — AZELASTINE 1 MG/ML
2 SPRAY, METERED NASAL 2 TIMES DAILY
Qty: 30 ML | Refills: 11 | Status: SHIPPED | OUTPATIENT
Start: 2024-10-28 | End: 2025-10-28

## 2024-11-02 DIAGNOSIS — J30.89 ALLERGIC RHINITIS DUE TO OTHER ALLERGIC TRIGGER, UNSPECIFIED SEASONALITY: Primary | ICD-10-CM

## 2024-11-02 PROCEDURE — 95165 ANTIGEN THERAPY SERVICES: CPT | Performed by: ALLERGY & IMMUNOLOGY

## 2024-11-11 ENCOUNTER — CLINICAL SUPPORT (OUTPATIENT)
Dept: ALLERGY | Facility: CLINIC | Age: 54
End: 2024-11-11
Payer: COMMERCIAL

## 2024-11-11 DIAGNOSIS — J30.89 ALLERGIC RHINITIS DUE TO OTHER ALLERGIC TRIGGER, UNSPECIFIED SEASONALITY: ICD-10-CM

## 2024-11-11 PROCEDURE — 95117 IMMUNOTHERAPY INJECTIONS: CPT | Performed by: ALLERGY & IMMUNOLOGY

## 2024-11-18 ENCOUNTER — APPOINTMENT (OUTPATIENT)
Dept: ALLERGY | Facility: CLINIC | Age: 54
End: 2024-11-18
Payer: COMMERCIAL

## 2024-11-18 DIAGNOSIS — J30.89 ALLERGIC RHINITIS DUE TO OTHER ALLERGIC TRIGGER, UNSPECIFIED SEASONALITY: ICD-10-CM

## 2024-11-18 PROCEDURE — 95117 IMMUNOTHERAPY INJECTIONS: CPT | Performed by: ALLERGY & IMMUNOLOGY

## 2024-11-25 ENCOUNTER — APPOINTMENT (OUTPATIENT)
Dept: ALLERGY | Facility: CLINIC | Age: 54
End: 2024-11-25
Payer: COMMERCIAL

## 2024-11-25 DIAGNOSIS — M06.9 RHEUMATOID ARTHRITIS, INVOLVING UNSPECIFIED SITE, UNSPECIFIED WHETHER RHEUMATOID FACTOR PRESENT (MULTI): Primary | ICD-10-CM

## 2024-11-25 DIAGNOSIS — J30.89 ALLERGIC RHINITIS DUE TO OTHER ALLERGIC TRIGGER, UNSPECIFIED SEASONALITY: ICD-10-CM

## 2024-11-25 PROCEDURE — 95117 IMMUNOTHERAPY INJECTIONS: CPT | Performed by: ALLERGY & IMMUNOLOGY

## 2024-11-25 RX ORDER — TOCILIZUMAB 180 MG/ML
162 INJECTION, SOLUTION SUBCUTANEOUS
Qty: 4 EACH | Refills: 0 | Status: SHIPPED | OUTPATIENT
Start: 2024-11-25 | End: 2024-12-23

## 2024-11-25 RX ORDER — TOCILIZUMAB 180 MG/ML
162 INJECTION, SOLUTION SUBCUTANEOUS
COMMUNITY
End: 2024-11-25 | Stop reason: SDUPTHER

## 2024-12-02 ENCOUNTER — APPOINTMENT (OUTPATIENT)
Dept: ALLERGY | Facility: CLINIC | Age: 54
End: 2024-12-02
Payer: COMMERCIAL

## 2024-12-06 ENCOUNTER — TELEMEDICINE (OUTPATIENT)
Dept: RHEUMATOLOGY | Facility: CLINIC | Age: 54
End: 2024-12-06
Payer: COMMERCIAL

## 2024-12-06 ENCOUNTER — APPOINTMENT (OUTPATIENT)
Dept: ALLERGY | Facility: CLINIC | Age: 54
End: 2024-12-06
Payer: COMMERCIAL

## 2024-12-06 DIAGNOSIS — M06.9 RHEUMATOID ARTHRITIS, INVOLVING UNSPECIFIED SITE, UNSPECIFIED WHETHER RHEUMATOID FACTOR PRESENT (MULTI): Primary | ICD-10-CM

## 2024-12-06 DIAGNOSIS — Z79.899 ENCOUNTER FOR LONG-TERM (CURRENT) USE OF MEDICATIONS: ICD-10-CM

## 2024-12-06 PROCEDURE — 99212 OFFICE O/P EST SF 10 MIN: CPT | Performed by: INTERNAL MEDICINE

## 2024-12-06 ASSESSMENT — ENCOUNTER SYMPTOMS: FATIGUE: 1

## 2024-12-06 NOTE — PROGRESS NOTES
Subjective   Patient ID: Sarika Figueroa is a 54 y.o. female who presents for Follow-up (Medication discussion).  Patient consented to this video visit.  Confirmed with her date of birth.  HPI  Patient with rheumatoid arthritis and rheumatoid nodules specifically in her hands.  Previous medications have included methotrexate, leflunomide, Humira, Enbrel, Xeljanz, Orencia.    Patient was last seen in October 2023 and at that time she was on Actemra and felt that she had improvement in her symptoms  She has been injecting once a week.  She started getting increasing joint symptoms and more nodules on her fingers.  She was going on vacation and did not renew her prescription but then after being off medicine for a while she started having increasing symptoms again.  She was having symptoms in her shoulders, hips, neck as well as her hands.  Is very fatigued.  Has more nodules on her fingers.  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  She then started having trouble with her insurance covering Actemra and has been without  Review of Systems   Constitutional:  Positive for fatigue.   Musculoskeletal:         Per HPI       Objective   Physical Exam  Unable to physically evaluate  GEN: NAD A&O x3 appropriate affect  Appearance of nodules on her fingers in the DIPs and PIPs    Assessment/Plan        Rheumatoid arthritis -previously has been on methotrexate, Humira, Enbrel, Xeljanz, Orencia.   -Most recently has been on Actemra and has had decrease in efficacy.  Has only partial response to the medicine.  She had been off and had increase in symptoms.  Will try for Kevzara 200 mg every 14 days.  Discussed side effects of medication with her.  She will do blood work    We can see her again in 4 to 6 months or as needed.    Spent at least 10 minutes with greater than 50% spent with patient

## 2024-12-09 ENCOUNTER — APPOINTMENT (OUTPATIENT)
Dept: ALLERGY | Facility: CLINIC | Age: 54
End: 2024-12-09
Payer: COMMERCIAL

## 2024-12-09 DIAGNOSIS — J30.89 ALLERGIC RHINITIS DUE TO OTHER ALLERGIC TRIGGER, UNSPECIFIED SEASONALITY: ICD-10-CM

## 2024-12-09 PROCEDURE — 95117 IMMUNOTHERAPY INJECTIONS: CPT | Performed by: ALLERGY & IMMUNOLOGY

## 2024-12-12 ENCOUNTER — LAB (OUTPATIENT)
Dept: LAB | Facility: LAB | Age: 54
End: 2024-12-12
Payer: COMMERCIAL

## 2024-12-12 DIAGNOSIS — M06.9 RHEUMATOID ARTHRITIS, INVOLVING UNSPECIFIED SITE, UNSPECIFIED WHETHER RHEUMATOID FACTOR PRESENT (MULTI): ICD-10-CM

## 2024-12-12 DIAGNOSIS — Z79.899 ENCOUNTER FOR LONG-TERM (CURRENT) USE OF MEDICATIONS: ICD-10-CM

## 2024-12-12 LAB
ALBUMIN SERPL BCP-MCNC: 4.4 G/DL (ref 3.4–5)
ALP SERPL-CCNC: 68 U/L (ref 33–110)
ALT SERPL W P-5'-P-CCNC: 17 U/L (ref 7–45)
ANION GAP SERPL CALC-SCNC: 10 MMOL/L (ref 10–20)
AST SERPL W P-5'-P-CCNC: 21 U/L (ref 9–39)
BILIRUB SERPL-MCNC: 0.6 MG/DL (ref 0–1.2)
BUN SERPL-MCNC: 18 MG/DL (ref 6–23)
CALCIUM SERPL-MCNC: 9.3 MG/DL (ref 8.6–10.3)
CHLORIDE SERPL-SCNC: 101 MMOL/L (ref 98–107)
CO2 SERPL-SCNC: 30 MMOL/L (ref 21–32)
CREAT SERPL-MCNC: 0.63 MG/DL (ref 0.5–1.05)
CRP SERPL-MCNC: 0.13 MG/DL
EGFRCR SERPLBLD CKD-EPI 2021: >90 ML/MIN/1.73M*2
ERYTHROCYTE [DISTWIDTH] IN BLOOD BY AUTOMATED COUNT: 11.6 % (ref 11.5–14.5)
ERYTHROCYTE [SEDIMENTATION RATE] IN BLOOD BY WESTERGREN METHOD: 7 MM/H (ref 0–30)
GLUCOSE SERPL-MCNC: 105 MG/DL (ref 74–99)
HCT VFR BLD AUTO: 41.2 % (ref 36–46)
HGB BLD-MCNC: 13.3 G/DL (ref 12–16)
MCH RBC QN AUTO: 30.7 PG (ref 26–34)
MCHC RBC AUTO-ENTMCNC: 32.3 G/DL (ref 32–36)
MCV RBC AUTO: 95 FL (ref 80–100)
NRBC BLD-RTO: 0 /100 WBCS (ref 0–0)
PLATELET # BLD AUTO: 318 X10*3/UL (ref 150–450)
POTASSIUM SERPL-SCNC: 3.9 MMOL/L (ref 3.5–5.3)
PROT SERPL-MCNC: 6.7 G/DL (ref 6.4–8.2)
RBC # BLD AUTO: 4.33 X10*6/UL (ref 4–5.2)
SODIUM SERPL-SCNC: 137 MMOL/L (ref 136–145)
WBC # BLD AUTO: 5.7 X10*3/UL (ref 4.4–11.3)

## 2024-12-12 PROCEDURE — 36415 COLL VENOUS BLD VENIPUNCTURE: CPT

## 2024-12-12 PROCEDURE — 85027 COMPLETE CBC AUTOMATED: CPT

## 2024-12-12 PROCEDURE — 86481 TB AG RESPONSE T-CELL SUSP: CPT

## 2024-12-12 PROCEDURE — 86140 C-REACTIVE PROTEIN: CPT

## 2024-12-12 PROCEDURE — 80053 COMPREHEN METABOLIC PANEL: CPT

## 2024-12-12 PROCEDURE — 85652 RBC SED RATE AUTOMATED: CPT

## 2024-12-16 ENCOUNTER — APPOINTMENT (OUTPATIENT)
Dept: ALLERGY | Facility: CLINIC | Age: 54
End: 2024-12-16
Payer: COMMERCIAL

## 2024-12-16 DIAGNOSIS — J30.89 ALLERGIC RHINITIS DUE TO OTHER ALLERGIC TRIGGER, UNSPECIFIED SEASONALITY: ICD-10-CM

## 2024-12-16 PROCEDURE — 95117 IMMUNOTHERAPY INJECTIONS: CPT | Performed by: ALLERGY & IMMUNOLOGY

## 2024-12-19 ENCOUNTER — APPOINTMENT (OUTPATIENT)
Dept: OPHTHALMOLOGY | Facility: CLINIC | Age: 54
End: 2024-12-19
Payer: COMMERCIAL

## 2024-12-19 ENCOUNTER — TELEPHONE (OUTPATIENT)
Dept: RHEUMATOLOGY | Facility: CLINIC | Age: 54
End: 2024-12-19

## 2024-12-19 ENCOUNTER — PATIENT MESSAGE (OUTPATIENT)
Dept: RHEUMATOLOGY | Facility: CLINIC | Age: 54
End: 2024-12-19

## 2024-12-19 DIAGNOSIS — M06.9 RHEUMATOID ARTHRITIS, INVOLVING UNSPECIFIED SITE, UNSPECIFIED WHETHER RHEUMATOID FACTOR PRESENT (MULTI): Primary | ICD-10-CM

## 2024-12-19 DIAGNOSIS — H02.889 MEIBOMIAN GLAND DISEASE, UNSPECIFIED LATERALITY: ICD-10-CM

## 2024-12-19 DIAGNOSIS — H52.13 MYOPIA OF BOTH EYES: Primary | ICD-10-CM

## 2024-12-19 PROCEDURE — 92014 COMPRE OPH EXAM EST PT 1/>: CPT | Performed by: OPTOMETRIST

## 2024-12-19 PROCEDURE — 92015 DETERMINE REFRACTIVE STATE: CPT | Performed by: OPTOMETRIST

## 2024-12-19 PROCEDURE — 99070(U24) BRUDER EYE MASK: Performed by: OPTOMETRIST

## 2024-12-19 PROCEDURE — V2520 CONTACT LENS HYDROPHILIC: HCPCS | Performed by: OPTOMETRIST

## 2024-12-19 PROCEDURE — 92310 CONTACT LENS FITTING OU: CPT | Performed by: OPTOMETRIST

## 2024-12-19 ASSESSMENT — CONF VISUAL FIELD
OD_INFERIOR_NASAL_RESTRICTION: 0
OD_INFERIOR_TEMPORAL_RESTRICTION: 0
OS_INFERIOR_TEMPORAL_RESTRICTION: 0
OS_NORMAL: 1
OS_SUPERIOR_NASAL_RESTRICTION: 0
OD_NORMAL: 1
OD_SUPERIOR_NASAL_RESTRICTION: 0
OD_SUPERIOR_TEMPORAL_RESTRICTION: 0
OS_INFERIOR_NASAL_RESTRICTION: 0
OS_SUPERIOR_TEMPORAL_RESTRICTION: 0

## 2024-12-19 ASSESSMENT — ENCOUNTER SYMPTOMS
EYES NEGATIVE: 1
NEUROLOGICAL NEGATIVE: 0
GASTROINTESTINAL NEGATIVE: 0
ALLERGIC/IMMUNOLOGIC NEGATIVE: 0
HEMATOLOGIC/LYMPHATIC NEGATIVE: 0
MUSCULOSKELETAL NEGATIVE: 0
PSYCHIATRIC NEGATIVE: 0
ENDOCRINE NEGATIVE: 0
RESPIRATORY NEGATIVE: 0
CARDIOVASCULAR NEGATIVE: 0
CONSTITUTIONAL NEGATIVE: 0

## 2024-12-19 ASSESSMENT — REFRACTION_CURRENTRX
OD_CYLINDER: SPHERE
OD_BRAND: ACUVUE 1 DAY MOIST
OD_SPHERE: -2.00
OD_DIAMETER: 14.2
OD_SPHERE: -1.75
OD_DIAMETER: 14.2
OD_CYLINDER: SPHERE
OD_BASECURVE: 8.5
OD_BASECURVE: 8.5
OD_BRAND: ACUVUE 1 DAY MOIST

## 2024-12-19 ASSESSMENT — VISUAL ACUITY
METHOD: SNELLEN - LINEAR
OS_SC+: -2
VA_OR_OD_CURRENT_RX: 20/20
OD_SC: J7
VA_OR_OD_CURRENT_RX: 20/20
OS_SC: 20/20
OD_CC: 20/20
CORRECTION_TYPE: CONTACTS
OS_CC: J1+

## 2024-12-19 ASSESSMENT — SLIT LAMP EXAM - LIDS: COMMENTS: GOOD POSITION, 1+ MGD

## 2024-12-19 ASSESSMENT — REFRACTION_MANIFEST
OD_SPHERE: -1.50
OS_ADD: +2.00
OD_CYLINDER: -0.25
OS_CYLINDER: SPHERE
OD_AXIS: 120
OD_ADD: +2.00
OS_SPHERE: -1.25

## 2024-12-19 ASSESSMENT — TONOMETRY
OS_IOP_MMHG: 15
OD_IOP_MMHG: 14
IOP_METHOD: GOLDMANN APPLANATION

## 2024-12-19 ASSESSMENT — EXTERNAL EXAM - LEFT EYE: OS_EXAM: NORMAL

## 2024-12-19 ASSESSMENT — REFRACTION_WEARINGRX
OD_CYLINDER: -0.25
OS_SPHERE: -1.75
OS_CYLINDER: SPHERE
OS_ADD: +2.00
OD_SPHERE: -2.25
OD_AXIS: 120
OD_ADD: +2.00

## 2024-12-19 ASSESSMENT — CUP TO DISC RATIO
OS_RATIO: .35
OD_RATIO: .35

## 2024-12-19 ASSESSMENT — EXTERNAL EXAM - RIGHT EYE: OD_EXAM: NORMAL

## 2024-12-19 NOTE — PROGRESS NOTES
PVD OD last summer. No new F and F. The patient was asked to return to our clinic or seek out eye care ASAP if new flashes of light or floaters are noted.       A spectacle prescription was dispensed to be used as needed. A contact lens prescription was dispensed at the patient's request. MonoVA only wears lens in the right eye. Order placed as well. Can use +1 OTC readers with Cls PRN.     The patient was asked to return to our clinic in one year or sooner if ocular or vision changes occur.

## 2024-12-23 ENCOUNTER — APPOINTMENT (OUTPATIENT)
Dept: ALLERGY | Facility: CLINIC | Age: 54
End: 2024-12-23
Payer: COMMERCIAL

## 2025-01-13 ENCOUNTER — APPOINTMENT (OUTPATIENT)
Dept: ALLERGY | Facility: CLINIC | Age: 55
End: 2025-01-13
Payer: COMMERCIAL

## 2025-01-13 DIAGNOSIS — J30.89 ALLERGIC RHINITIS DUE TO OTHER ALLERGIC TRIGGER, UNSPECIFIED SEASONALITY: ICD-10-CM

## 2025-01-13 PROCEDURE — 95117 IMMUNOTHERAPY INJECTIONS: CPT | Performed by: ALLERGY & IMMUNOLOGY

## 2025-01-22 ENCOUNTER — OFFICE VISIT (OUTPATIENT)
Dept: URGENT CARE | Age: 55
End: 2025-01-22
Payer: COMMERCIAL

## 2025-01-22 ENCOUNTER — APPOINTMENT (OUTPATIENT)
Dept: PRIMARY CARE | Facility: CLINIC | Age: 55
End: 2025-01-22
Payer: COMMERCIAL

## 2025-01-22 VITALS
SYSTOLIC BLOOD PRESSURE: 115 MMHG | DIASTOLIC BLOOD PRESSURE: 68 MMHG | RESPIRATION RATE: 16 BRPM | OXYGEN SATURATION: 99 % | TEMPERATURE: 97.8 F | HEART RATE: 69 BPM

## 2025-01-22 DIAGNOSIS — Z20.822 SUSPECTED 2019 NOVEL CORONAVIRUS INFECTION: Primary | ICD-10-CM

## 2025-01-22 DIAGNOSIS — J06.9 VIRAL UPPER RESPIRATORY TRACT INFECTION: ICD-10-CM

## 2025-01-22 PROCEDURE — 99203 OFFICE O/P NEW LOW 30 MIN: CPT

## 2025-01-22 PROCEDURE — 87635 SARS-COV-2 COVID-19 AMP PRB: CPT

## 2025-01-22 PROCEDURE — 1036F TOBACCO NON-USER: CPT

## 2025-01-22 NOTE — PROGRESS NOTES
Subjective   History of Present Illness: Sarika Figueroa is a 54 y.o. female. They present today with a chief complaint of URI symptoms x 5 days. States that her symptoms have been improving with Tylenol and cold and halie OTC meds. Took 2 neg home COVID tests. Pt's employer requesting her to get a COVID PCR test.       Past Medical History  Allergies as of 01/22/2025 - Reviewed 01/22/2025   Allergen Reaction Noted    Humira [adalimumab] Rash 08/28/2023       (Not in a hospital admission)       Past Medical History:   Diagnosis Date    Chest pain, unspecified 03/21/2018    Chest pain    Encounter for other screening for malignant neoplasm of breast 10/23/2020    Breast screening    Encounter for screening for malignant neoplasm of colon 01/12/2021    Colon cancer screening    Excessive and frequent menstruation with regular cycle     Menorrhagia with regular cycle    Hyperlipidemia, unspecified 10/28/2021    Dyslipidemia (high LDL; low HDL)    Palpitations 03/21/2018    Palpitations    Personal history of other infectious and parasitic diseases 06/14/2016    History of Helicobacter infection       Past Surgical History:   Procedure Laterality Date    BACK SURGERY  10/14/2016    Back Surgery    COLONOSCOPY  06/14/2016    Complete Colonoscopy    HYSTEROSCOPY  02/15/2018    Hysteroscopy With Endometrial Ablation    OTHER SURGICAL HISTORY  03/02/2022    Dilation and curettage    SHOULDER SURGERY  10/14/2016    Shoulder Surgery        reports that she has never smoked. She has never been exposed to tobacco smoke. She has never used smokeless tobacco. She reports current alcohol use of about 2.0 standard drinks of alcohol per week. She reports that she does not use drugs.    Review of Systems  Review of Systems                               Objective    Vitals:    01/22/25 1022   BP: 115/68   BP Location: Left arm   Patient Position: Sitting   BP Cuff Size: Adult   Pulse: 69   Resp: 16   Temp: 36.6 °C (97.8 °F)   TempSrc:  Oral   SpO2: 99%     No LMP recorded. Patient is postmenopausal.    Physical Exam  Vitals reviewed.   HENT:      Head: Normocephalic and atraumatic.      Nose: Congestion present.      Mouth/Throat:      Mouth: Mucous membranes are moist.      Pharynx: Oropharynx is clear. Uvula midline. Postnasal drip present.   Eyes:      Extraocular Movements: Extraocular movements intact.      Conjunctiva/sclera: Conjunctivae normal.      Pupils: Pupils are equal, round, and reactive to light.   Cardiovascular:      Rate and Rhythm: Normal rate and regular rhythm.   Pulmonary:      Effort: Pulmonary effort is normal.      Breath sounds: Normal breath sounds.   Skin:     General: Skin is warm.   Neurological:      Mental Status: She is alert and oriented to person, place, and time.   Psychiatric:         Mood and Affect: Mood normal.         Behavior: Behavior normal.             Point of Care Test & Imaging Results from this visit  No results found for this visit on 01/22/25.   No results found.    Diagnostic study results (if any) were reviewed by Tung Resendez PA-C.    Assessment/Plan   Allergies, medications, history, and pertinent labs/EKGs/Imaging reviewed by Tung Resendez PA-C.     Medical Decision Making  - COVID pcr test sent out. Supportive care tx.  -         Patient is educated about their diagnoses.     -          Discussed medications benefits and adverse effects.     -          Answered all patient’s questions.     -          Patient will call 911 or go to the nearest ED if worsen symptoms .     -          Patient is agreeable to the plan of care and is deemed stable upon discharge.     -          Follow up with your primary care provider in two days.      Orders and Diagnoses  Diagnoses and all orders for this visit:  Suspected 2019 novel coronavirus infection  -     Sars-CoV-2 PCR  Viral upper respiratory tract infection      Medical Admin Record      Patient disposition: Home    Electronically signed by Tung  CAROL ANN Resendez  10:32 AM

## 2025-01-23 ENCOUNTER — TELEPHONE (OUTPATIENT)
Dept: URGENT CARE | Age: 55
End: 2025-01-23

## 2025-01-23 LAB — SARS-COV-2 RNA RESP QL NAA+PROBE: NOT DETECTED

## 2025-01-27 ENCOUNTER — APPOINTMENT (OUTPATIENT)
Dept: ALLERGY | Facility: CLINIC | Age: 55
End: 2025-01-27
Payer: COMMERCIAL

## 2025-01-27 DIAGNOSIS — J30.89 ALLERGIC RHINITIS DUE TO OTHER ALLERGIC TRIGGER, UNSPECIFIED SEASONALITY: ICD-10-CM

## 2025-01-27 PROCEDURE — 95117 IMMUNOTHERAPY INJECTIONS: CPT | Performed by: ALLERGY & IMMUNOLOGY

## 2025-02-03 ENCOUNTER — APPOINTMENT (OUTPATIENT)
Dept: ALLERGY | Facility: CLINIC | Age: 55
End: 2025-02-03
Payer: COMMERCIAL

## 2025-02-03 DIAGNOSIS — J30.89 ALLERGIC RHINITIS DUE TO OTHER ALLERGIC TRIGGER, UNSPECIFIED SEASONALITY: ICD-10-CM

## 2025-02-03 PROCEDURE — 95117 IMMUNOTHERAPY INJECTIONS: CPT | Performed by: ALLERGY & IMMUNOLOGY

## 2025-02-10 ENCOUNTER — APPOINTMENT (OUTPATIENT)
Dept: ALLERGY | Facility: CLINIC | Age: 55
End: 2025-02-10
Payer: COMMERCIAL

## 2025-02-10 ENCOUNTER — TELEPHONE (OUTPATIENT)
Dept: OPHTHALMOLOGY | Facility: CLINIC | Age: 55
End: 2025-02-10

## 2025-02-10 DIAGNOSIS — J30.89 ALLERGIC RHINITIS DUE TO OTHER ALLERGIC TRIGGER, UNSPECIFIED SEASONALITY: ICD-10-CM

## 2025-02-10 PROCEDURE — 95117 IMMUNOTHERAPY INJECTIONS: CPT | Performed by: ALLERGY & IMMUNOLOGY

## 2025-02-17 ENCOUNTER — APPOINTMENT (OUTPATIENT)
Dept: UROLOGY | Facility: CLINIC | Age: 55
End: 2025-02-17
Payer: COMMERCIAL

## 2025-02-24 ENCOUNTER — APPOINTMENT (OUTPATIENT)
Dept: ALLERGY | Facility: CLINIC | Age: 55
End: 2025-02-24
Payer: COMMERCIAL

## 2025-02-24 ENCOUNTER — CLINICAL SUPPORT (OUTPATIENT)
Dept: ALLERGY | Facility: CLINIC | Age: 55
End: 2025-02-24
Payer: COMMERCIAL

## 2025-02-24 DIAGNOSIS — J30.89 ALLERGIC RHINITIS DUE TO OTHER ALLERGIC TRIGGER, UNSPECIFIED SEASONALITY: ICD-10-CM

## 2025-02-24 DIAGNOSIS — J30.2 SEASONAL ALLERGIC RHINITIS, UNSPECIFIED TRIGGER: ICD-10-CM

## 2025-02-24 PROCEDURE — 95117 IMMUNOTHERAPY INJECTIONS: CPT | Performed by: ALLERGY & IMMUNOLOGY

## 2025-03-03 ENCOUNTER — APPOINTMENT (OUTPATIENT)
Dept: ALLERGY | Facility: CLINIC | Age: 55
End: 2025-03-03
Payer: COMMERCIAL

## 2025-03-03 DIAGNOSIS — J30.89 ALLERGIC RHINITIS DUE TO OTHER ALLERGIC TRIGGER, UNSPECIFIED SEASONALITY: ICD-10-CM

## 2025-03-03 PROCEDURE — 95117 IMMUNOTHERAPY INJECTIONS: CPT | Performed by: ALLERGY & IMMUNOLOGY

## 2025-03-17 ENCOUNTER — APPOINTMENT (OUTPATIENT)
Dept: ALLERGY | Facility: CLINIC | Age: 55
End: 2025-03-17
Payer: COMMERCIAL

## 2025-03-17 DIAGNOSIS — J30.89 ALLERGIC RHINITIS DUE TO OTHER ALLERGIC TRIGGER, UNSPECIFIED SEASONALITY: ICD-10-CM

## 2025-03-17 PROCEDURE — 95117 IMMUNOTHERAPY INJECTIONS: CPT | Performed by: ALLERGY & IMMUNOLOGY

## 2025-03-26 ENCOUNTER — TELEPHONE (OUTPATIENT)
Dept: PRIMARY CARE | Facility: CLINIC | Age: 55
End: 2025-03-26
Payer: COMMERCIAL

## 2025-03-26 NOTE — TELEPHONE ENCOUNTER
called and stated his wife had passed out at least 14 times since 3am this morning and wanted to bring her in   I told him that he needed to take her to the Emergency room that we did not have the ability to assess this issue.      He was in agreement and is going to go to the ED.    [phone]

## 2025-04-02 ENCOUNTER — APPOINTMENT (OUTPATIENT)
Dept: PRIMARY CARE | Facility: CLINIC | Age: 55
End: 2025-04-02
Payer: COMMERCIAL

## 2025-04-02 VITALS — DIASTOLIC BLOOD PRESSURE: 62 MMHG | BODY MASS INDEX: 22.13 KG/M2 | SYSTOLIC BLOOD PRESSURE: 98 MMHG | WEIGHT: 133 LBS

## 2025-04-02 DIAGNOSIS — Z13.29 SCREENING FOR ENDOCRINE, METABOLIC AND IMMUNITY DISORDER: ICD-10-CM

## 2025-04-02 DIAGNOSIS — R55 SYNCOPE, UNSPECIFIED SYNCOPE TYPE: ICD-10-CM

## 2025-04-02 DIAGNOSIS — D64.9 ANEMIA, UNSPECIFIED TYPE: ICD-10-CM

## 2025-04-02 DIAGNOSIS — Z13.6 SCREENING FOR CARDIOVASCULAR CONDITION: Primary | ICD-10-CM

## 2025-04-02 DIAGNOSIS — Z13.228 SCREENING FOR ENDOCRINE, METABOLIC AND IMMUNITY DISORDER: ICD-10-CM

## 2025-04-02 DIAGNOSIS — E78.00 ELEVATED LDL CHOLESTEROL LEVEL: ICD-10-CM

## 2025-04-02 DIAGNOSIS — Z13.0 SCREENING FOR ENDOCRINE, METABOLIC AND IMMUNITY DISORDER: ICD-10-CM

## 2025-04-02 PROCEDURE — 1036F TOBACCO NON-USER: CPT | Performed by: INTERNAL MEDICINE

## 2025-04-02 PROCEDURE — 99215 OFFICE O/P EST HI 40 MIN: CPT | Performed by: INTERNAL MEDICINE

## 2025-04-02 RX ORDER — RABEPRAZOLE SODIUM 20 MG/1
20 TABLET, DELAYED RELEASE ORAL
COMMUNITY
Start: 2025-03-28 | End: 2025-04-27

## 2025-04-02 RX ORDER — LEVETIRACETAM 500 MG/1
500 TABLET ORAL 2 TIMES DAILY
COMMUNITY
Start: 2025-03-28 | End: 2025-05-27

## 2025-04-02 ASSESSMENT — PATIENT HEALTH QUESTIONNAIRE - PHQ9
2. FEELING DOWN, DEPRESSED OR HOPELESS: NOT AT ALL
1. LITTLE INTEREST OR PLEASURE IN DOING THINGS: NOT AT ALL
1. LITTLE INTEREST OR PLEASURE IN DOING THINGS: NOT AT ALL
SUM OF ALL RESPONSES TO PHQ9 QUESTIONS 1 AND 2: 0
2. FEELING DOWN, DEPRESSED OR HOPELESS: NOT AT ALL
SUM OF ALL RESPONSES TO PHQ9 QUESTIONS 1 AND 2: 0

## 2025-04-02 NOTE — LETTER
April 2, 2025     Patient: Sarika Figueroa   YOB: 1970   Date of Visit: 4/2/2025       To Whom It May Concern:    Sarika Figueroa was seen in my clinic on 4/2/2025 at 2:00 pm. Please excuse patient from work from March 26 to April 4, 2025 due to current medical condition.  May return to work on Monday April 7, 2025 with no restrictions.     If you have any questions or concerns, please don't hesitate to call.         Sincerely,     Ami Ramirez MD  Electronically signed

## 2025-04-02 NOTE — PROGRESS NOTES
Subjective   Patient ID: Sarika Figueroa is a 54 y.o. female who presents for Hospital Follow-up.    HPI  Patient in for a visit  Borderline anemia no more menstrual   Went to the ED syncope   One bout of diarrhea , dark no blood no stomach upset  Off all rheumatoid meds since dec ,   She had egd , rabeprazole   Review of Systems  General: Denies fever, chills, night sweats,  Eyes: Negative for recent visual changes  Ears, Nose, Throat :  Negative for hearing changes, sinus discomfort  Dermatologic: Negative for new skin conditions, rash  Respiratory: Negative for wheezing, shortness of breath, cough  Cardiovascular: Negative for chest pain, palpitations, or leg swelling  Gastrointestinal: Negative for nausea/vomiting, abdominal pain, changes in bowel habits  Genitourinary Negative for Urinary Incontinence  urgency , frequency, discomfort   Musculoskeletal: see hpi  Neurological: Negative for headaches, dizziness    Previous history  Past Medical History:   Diagnosis Date    Chest pain, unspecified 03/21/2018    Chest pain    Encounter for other screening for malignant neoplasm of breast 10/23/2020    Breast screening    Encounter for screening for malignant neoplasm of colon 01/12/2021    Colon cancer screening    Excessive and frequent menstruation with regular cycle     Menorrhagia with regular cycle    Hyperlipidemia, unspecified 10/28/2021    Dyslipidemia (high LDL; low HDL)    Palpitations 03/21/2018    Palpitations    Personal history of other infectious and parasitic diseases 06/14/2016    History of Helicobacter infection     Past Surgical History:   Procedure Laterality Date    BACK SURGERY  10/14/2016    Back Surgery    COLONOSCOPY  06/14/2016    Complete Colonoscopy    HYSTEROSCOPY  02/15/2018    Hysteroscopy With Endometrial Ablation    OTHER SURGICAL HISTORY  03/02/2022    Dilation and curettage    SHOULDER SURGERY  10/14/2016    Shoulder Surgery     Social History     Tobacco Use    Smoking status:  Never     Passive exposure: Never    Smokeless tobacco: Never   Vaping Use    Vaping status: Never Used   Substance Use Topics    Alcohol use: Yes     Alcohol/week: 2.0 standard drinks of alcohol     Types: 2 Glasses of wine per week    Drug use: Never     Family History   Problem Relation Name Age of Onset    Migraines Mother      Macular degeneration Mother  83    Colonic polyp Father      Other (abdominal aneurysm) Brother      Breast cancer Mother's Sister      Ulcerative colitis Paternal Grandfather      Colonic polyp Paternal Grandfather      Ulcerative colitis Other sibling      Allergies   Allergen Reactions    Gluten Unknown    Humira [Adalimumab] Rash     Current Outpatient Medications   Medication Instructions    albuterol (ProAir HFA) 90 mcg/actuation inhaler 1-2 puffs, inhalation, Every 4 hours PRN    azelastine (Astelin) 137 mcg (0.1 %) nasal spray 2 sprays, Each Nostril, 2 times daily, Use in each nostril as directed    EPINEPHrine (EPIPEN) 0.3 mg, intramuscular, As needed, Inject into UPPER, OUTER THIGH. Call 911 after use.    levETIRAcetam (KEPPRA) 500 mg, 2 times daily    montelukast (SINGULAIR) 10 mg, oral, Daily PRN    RABEprazole (ACIPHEX) 20 mg, Daily RT    sarilumab (KEVZARA) 200 mg, subcutaneous, Every 14 days       Objective       Physical Exam  Vital Signs: as recorded above  General: Well groomed, well nourished   Orientation:  Alert , oriented to time, place , and person   Mood and Affect:  Cooperative , no apparent distress normal affect  Skin: Good color, good turgor  Eyes: Extra ocular muscle movements intact, anicteric sclerae  Neck: Supple, full range of movement  Chest: Normal breath sounds, normal chest wall exam, symmetric, good air entry, clear to auscultation  Heart: Regular rate and rhythm, without murmur, gallop, or rubs  Abdomen soft nontender no masses felt no hepatosplenomegaly, no rebound or guarding  BACK:  no CTLS spine tenderness, no flank tenderness  Extremities: full  range of movement  bilateral UE and bilateral LE,  no lower extremity edema  Neurological: Alert, oriented, cranial nerves II-XII grossly intact except for visual acuity  Sensation:  Intact   Gait: normal steady      Assessment/Plan   Sarika Figueroa is a 54 y.o. female who presents for the concerns below:    Problem List Items Addressed This Visit    None    Gastropathy - biopsy , continue the rabeprazole    Please eat foods rich in iron I.e spinach broccoli kale brussel sprouts . recheck if with  dizziness, fatigue we need to recheck as soon as possible.  if you have any questions please send me a message or give our office a call [phone].     Syncope eeg , on keppra for diagnostic , need ffup with neurology , bp is low normally when she was sick vomiting likely unable to keep up (received 3-4 IV bags) so in the future should have      Hypotension very susceptible to dehydration liquid iv if sweating a lot or with any hypovolemic episodes     ASTHMA PLAN:  Stable Continue current regimen.   Hydration is important, water intake 6-8 glasses a day at least      HYPERCHOLESTEROLEMIA PLAN:  elevated not on medications  Follow low cholesterol diet, encouraged high omega 3 fatty acid intake in diet, exercise, weight control. . Will Obtain AST/ALT, fasting lipid profile if not done within past 3-6 months . Coenzyme Q10 200 mg a day if able to help increase HDL.        Discussed with:   Return in :    Portions of this note were generated using digital voice recognition software, and may contain grammatical errors       Ami Ramirez MD  04/02/25  2:57 PM

## 2025-04-14 ENCOUNTER — APPOINTMENT (OUTPATIENT)
Dept: ALLERGY | Facility: CLINIC | Age: 55
End: 2025-04-14
Payer: COMMERCIAL

## 2025-04-14 DIAGNOSIS — J30.89 ALLERGIC RHINITIS DUE TO OTHER ALLERGIC TRIGGER, UNSPECIFIED SEASONALITY: ICD-10-CM

## 2025-04-14 PROCEDURE — 95117 IMMUNOTHERAPY INJECTIONS: CPT | Performed by: ALLERGY & IMMUNOLOGY

## 2025-05-12 ENCOUNTER — APPOINTMENT (OUTPATIENT)
Dept: ALLERGY | Facility: CLINIC | Age: 55
End: 2025-05-12
Payer: COMMERCIAL

## 2025-05-12 DIAGNOSIS — J30.89 ALLERGIC RHINITIS DUE TO OTHER ALLERGIC TRIGGER, UNSPECIFIED SEASONALITY: ICD-10-CM

## 2025-05-12 PROCEDURE — 95117 IMMUNOTHERAPY INJECTIONS: CPT | Performed by: ALLERGY & IMMUNOLOGY

## 2025-06-09 ENCOUNTER — APPOINTMENT (OUTPATIENT)
Dept: ALLERGY | Facility: CLINIC | Age: 55
End: 2025-06-09
Payer: COMMERCIAL

## 2025-06-09 DIAGNOSIS — J30.89 ALLERGIC RHINITIS DUE TO OTHER ALLERGIC TRIGGER, UNSPECIFIED SEASONALITY: ICD-10-CM

## 2025-06-09 PROCEDURE — 95117 IMMUNOTHERAPY INJECTIONS: CPT | Performed by: ALLERGY & IMMUNOLOGY

## 2025-06-12 ENCOUNTER — OFFICE VISIT (OUTPATIENT)
Dept: RHEUMATOLOGY | Facility: CLINIC | Age: 55
End: 2025-06-12
Payer: COMMERCIAL

## 2025-06-12 VITALS
OXYGEN SATURATION: 96 % | HEART RATE: 58 BPM | SYSTOLIC BLOOD PRESSURE: 96 MMHG | BODY MASS INDEX: 22.13 KG/M2 | DIASTOLIC BLOOD PRESSURE: 62 MMHG | WEIGHT: 133 LBS

## 2025-06-12 DIAGNOSIS — M06.9 RHEUMATOID ARTHRITIS, INVOLVING UNSPECIFIED SITE, UNSPECIFIED WHETHER RHEUMATOID FACTOR PRESENT (MULTI): Primary | ICD-10-CM

## 2025-06-12 DIAGNOSIS — Z79.899 ENCOUNTER FOR LONG-TERM (CURRENT) USE OF MEDICATIONS: ICD-10-CM

## 2025-06-12 PROCEDURE — 99214 OFFICE O/P EST MOD 30 MIN: CPT | Performed by: INTERNAL MEDICINE

## 2025-06-12 PROCEDURE — 1036F TOBACCO NON-USER: CPT | Performed by: INTERNAL MEDICINE

## 2025-06-12 RX ORDER — FAMOTIDINE 10 MG/ML
20 INJECTION, SOLUTION INTRAVENOUS ONCE AS NEEDED
OUTPATIENT
Start: 2025-06-26

## 2025-06-12 RX ORDER — EPINEPHRINE 0.3 MG/.3ML
0.3 INJECTION SUBCUTANEOUS EVERY 5 MIN PRN
OUTPATIENT
Start: 2025-06-26

## 2025-06-12 RX ORDER — HEPARIN SODIUM,PORCINE/PF 10 UNIT/ML
50 SYRINGE (ML) INTRAVENOUS AS NEEDED
OUTPATIENT
Start: 2025-06-12

## 2025-06-12 RX ORDER — DIPHENHYDRAMINE HYDROCHLORIDE 50 MG/ML
50 INJECTION, SOLUTION INTRAMUSCULAR; INTRAVENOUS AS NEEDED
OUTPATIENT
Start: 2025-06-26

## 2025-06-12 RX ORDER — LEVETIRACETAM 500 MG/1
500 TABLET ORAL 2 TIMES DAILY
COMMUNITY
Start: 2025-06-10 | End: 2025-09-08

## 2025-06-12 RX ORDER — HEPARIN 100 UNIT/ML
500 SYRINGE INTRAVENOUS AS NEEDED
OUTPATIENT
Start: 2025-06-12

## 2025-06-12 RX ORDER — PREDNISONE 5 MG/1
5-10 TABLET ORAL DAILY
Qty: 60 TABLET | Refills: 2 | Status: SHIPPED | OUTPATIENT
Start: 2025-06-12 | End: 2025-09-10

## 2025-06-12 RX ORDER — ALBUTEROL SULFATE 0.83 MG/ML
3 SOLUTION RESPIRATORY (INHALATION) AS NEEDED
OUTPATIENT
Start: 2025-06-26

## 2025-06-12 ASSESSMENT — ENCOUNTER SYMPTOMS
DEPRESSION: 0
FATIGUE: 1
LOSS OF SENSATION IN FEET: 0
OCCASIONAL FEELINGS OF UNSTEADINESS: 0

## 2025-06-12 ASSESSMENT — PATIENT HEALTH QUESTIONNAIRE - PHQ9
1. LITTLE INTEREST OR PLEASURE IN DOING THINGS: NOT AT ALL
SUM OF ALL RESPONSES TO PHQ9 QUESTIONS 1 AND 2: 0
2. FEELING DOWN, DEPRESSED OR HOPELESS: NOT AT ALL

## 2025-06-12 NOTE — PROGRESS NOTES
Subjective   Patient ID: Sarika Figueroa is a 55 y.o. female who presents for Follow-up.    HPI  Patient with rheumatoid arthritis and rheumatoid nodules specifically in her hands.  Previous medications have included methotrexate, leflunomide, Humira, Enbrel, Xeljanz, Orencia.    Her last visit was a telehealth visit and I was unable to physically evaluate her in December.  She felt that Actemra had a decrease in efficacy only had partial response to medication and we tried for Kevzara. We did get it approved.  Unfortunately she never filled it as it was too expensive for her to fill at that time.    Unfortunately she had episodes In March of nausea and vomiting as well as passing out.  She had some posturing so it was hard to say if she might be having seizures.  She had workup with neurology including EEG that was negative.  She is currently on Keppra though.  She also had tilt table test looking for POTS and this was normal.  She has had Holter monitor as well.  There is a possibility that she had vasovagal events.  She has had only 1 episode since that time that was presyncope.    Since she has not been on any medication the nodules on her fingers have been getting bigger leading her to think that the Actemra was least keeping them down.  She has been hurting all the time especially in her hands and feet.  She gets also achy in her shoulders, elbows, knees.    She did have an EGD that did show some gastritis.  Review of Systems   Constitutional:  Positive for fatigue.   Musculoskeletal:         Per HPI       Objective   Physical Exam  GEN: NAD A&O x3 appropriate affect  MSK  Has had increase in nodules on her DIPs and PIPs from previous.  No current swelling of the wrists elbows shoulders some discomfort with palpation of her fingers where the nodules are and also in her feet.  No swelling in the ankles   Assessment/Plan        Rheumatoid arthritis -previously has been on methotrexate, Humira, Enbrel, Xeljanz,  Orencia, Actemra  - Has not been on any DMARD/biologic DMARD in several months.  We will put in for Kevzara 200 mg every 14 days as most likely she has met her deductible and is able to get medication.  She is concerned about the end of the year and that she will be able to afford medication again.  We also can consider doing IV Actemra and perhaps the higher dose would be better for her symptoms that she did get a positive response.  She did not find any response with anti-TNF inhibitors previously.  Will have her do blood work looking at hepatitis B, C, tuberculosis.  - Reviewed her medical record from her hospitalizations and all of the blood work that she has had in the interim.  - She has been taking ibuprofen and I discouraged her from doing this as she did have gastritis during her recent EGD.  Will give her prednisone that she can take 5 to 10 mg which may not affect her as much as NSAIDs.    Can see her again in 4 to 6 months or as needed

## 2025-06-14 LAB
HBV CORE AB SERPL QL IA: NORMAL
HBV SURFACE AG SERPL QL IA: NORMAL
HCV AB SERPL QL IA: NORMAL
IGNF NEG CNTRL BLD: NORMAL
M TB IFN-G BLD-IMP: NEGATIVE
MITOGEN IGNF.SPOT COUNT BLD: NORMAL
QUEST PANEL A SPOT COUNT: 0
QUEST PANEL B SPOT COUNT: 0

## 2025-07-14 ENCOUNTER — APPOINTMENT (OUTPATIENT)
Dept: ALLERGY | Facility: CLINIC | Age: 55
End: 2025-07-14
Payer: COMMERCIAL

## 2025-07-21 ENCOUNTER — APPOINTMENT (OUTPATIENT)
Dept: ALLERGY | Facility: CLINIC | Age: 55
End: 2025-07-21
Payer: COMMERCIAL

## 2025-07-21 DIAGNOSIS — J30.89 ALLERGIC RHINITIS DUE TO OTHER ALLERGIC TRIGGER, UNSPECIFIED SEASONALITY: ICD-10-CM

## 2025-07-21 PROCEDURE — 95117 IMMUNOTHERAPY INJECTIONS: CPT | Performed by: ALLERGY & IMMUNOLOGY

## 2025-07-25 ENCOUNTER — TELEPHONE (OUTPATIENT)
Dept: OPHTHALMOLOGY | Facility: CLINIC | Age: 55
End: 2025-07-25

## 2025-08-18 ENCOUNTER — APPOINTMENT (OUTPATIENT)
Dept: ALLERGY | Facility: CLINIC | Age: 55
End: 2025-08-18
Payer: COMMERCIAL

## 2025-08-18 DIAGNOSIS — J30.89 ALLERGIC RHINITIS DUE TO OTHER ALLERGIC TRIGGER, UNSPECIFIED SEASONALITY: ICD-10-CM

## 2025-08-18 PROCEDURE — 95117 IMMUNOTHERAPY INJECTIONS: CPT | Performed by: ALLERGY & IMMUNOLOGY

## 2025-09-04 ENCOUNTER — DOCUMENTATION (OUTPATIENT)
Dept: INFUSION THERAPY | Facility: CLINIC | Age: 55
End: 2025-09-04
Payer: COMMERCIAL

## 2025-09-29 ENCOUNTER — APPOINTMENT (OUTPATIENT)
Dept: ALLERGY | Facility: CLINIC | Age: 55
End: 2025-09-29
Payer: COMMERCIAL